# Patient Record
Sex: MALE | Race: OTHER | HISPANIC OR LATINO | URBAN - METROPOLITAN AREA
[De-identification: names, ages, dates, MRNs, and addresses within clinical notes are randomized per-mention and may not be internally consistent; named-entity substitution may affect disease eponyms.]

---

## 2020-08-10 ENCOUNTER — EMERGENCY (EMERGENCY)
Facility: HOSPITAL | Age: 25
LOS: 1 days | Discharge: HOME | End: 2020-08-10
Attending: EMERGENCY MEDICINE
Payer: COMMERCIAL

## 2020-08-10 VITALS
HEART RATE: 68 BPM | TEMPERATURE: 99 F | OXYGEN SATURATION: 100 % | SYSTOLIC BLOOD PRESSURE: 122 MMHG | RESPIRATION RATE: 20 BRPM | DIASTOLIC BLOOD PRESSURE: 80 MMHG

## 2020-08-10 VITALS
DIASTOLIC BLOOD PRESSURE: 96 MMHG | HEART RATE: 69 BPM | OXYGEN SATURATION: 99 % | SYSTOLIC BLOOD PRESSURE: 145 MMHG | TEMPERATURE: 99 F | WEIGHT: 184.09 LBS | HEIGHT: 62 IN | RESPIRATION RATE: 20 BRPM

## 2020-08-10 DIAGNOSIS — Z98.890 OTHER SPECIFIED POSTPROCEDURAL STATES: Chronic | ICD-10-CM

## 2020-08-10 LAB
ALBUMIN SERPL ELPH-MCNC: 3.9 G/DL — SIGNIFICANT CHANGE UP (ref 3.5–5)
ALP SERPL-CCNC: 115 U/L — SIGNIFICANT CHANGE UP (ref 40–120)
ALT FLD-CCNC: 238 U/L DA — HIGH (ref 10–60)
ANION GAP SERPL CALC-SCNC: 7 MMOL/L — SIGNIFICANT CHANGE UP (ref 5–17)
AST SERPL-CCNC: 100 U/L — HIGH (ref 10–40)
BASOPHILS # BLD AUTO: 0.05 K/UL — SIGNIFICANT CHANGE UP (ref 0–0.2)
BASOPHILS NFR BLD AUTO: 0.5 % — SIGNIFICANT CHANGE UP (ref 0–2)
BILIRUB SERPL-MCNC: 0.3 MG/DL — SIGNIFICANT CHANGE UP (ref 0.2–1.2)
BUN SERPL-MCNC: 10 MG/DL — SIGNIFICANT CHANGE UP (ref 7–18)
CALCIUM SERPL-MCNC: 8.8 MG/DL — SIGNIFICANT CHANGE UP (ref 8.4–10.5)
CHLORIDE SERPL-SCNC: 107 MMOL/L — SIGNIFICANT CHANGE UP (ref 96–108)
CK SERPL-CCNC: 136 U/L — SIGNIFICANT CHANGE UP (ref 35–232)
CO2 SERPL-SCNC: 27 MMOL/L — SIGNIFICANT CHANGE UP (ref 22–31)
CREAT SERPL-MCNC: 0.8 MG/DL — SIGNIFICANT CHANGE UP (ref 0.5–1.3)
D DIMER BLD IA.RAPID-MCNC: <150 NG/ML DDU — SIGNIFICANT CHANGE UP
EOSINOPHIL # BLD AUTO: 0.5 K/UL — SIGNIFICANT CHANGE UP (ref 0–0.5)
EOSINOPHIL NFR BLD AUTO: 4.7 % — SIGNIFICANT CHANGE UP (ref 0–6)
GLUCOSE SERPL-MCNC: 99 MG/DL — SIGNIFICANT CHANGE UP (ref 70–99)
HCT VFR BLD CALC: 46.3 % — SIGNIFICANT CHANGE UP (ref 39–50)
HGB BLD-MCNC: 16.1 G/DL — SIGNIFICANT CHANGE UP (ref 13–17)
IMM GRANULOCYTES NFR BLD AUTO: 0.3 % — SIGNIFICANT CHANGE UP (ref 0–1.5)
LYMPHOCYTES # BLD AUTO: 3.47 K/UL — HIGH (ref 1–3.3)
LYMPHOCYTES # BLD AUTO: 32.7 % — SIGNIFICANT CHANGE UP (ref 13–44)
MCHC RBC-ENTMCNC: 30.2 PG — SIGNIFICANT CHANGE UP (ref 27–34)
MCHC RBC-ENTMCNC: 34.8 GM/DL — SIGNIFICANT CHANGE UP (ref 32–36)
MCV RBC AUTO: 86.9 FL — SIGNIFICANT CHANGE UP (ref 80–100)
MONOCYTES # BLD AUTO: 0.87 K/UL — SIGNIFICANT CHANGE UP (ref 0–0.9)
MONOCYTES NFR BLD AUTO: 8.2 % — SIGNIFICANT CHANGE UP (ref 2–14)
NEUTROPHILS # BLD AUTO: 5.7 K/UL — SIGNIFICANT CHANGE UP (ref 1.8–7.4)
NEUTROPHILS NFR BLD AUTO: 53.6 % — SIGNIFICANT CHANGE UP (ref 43–77)
NRBC # BLD: 0 /100 WBCS — SIGNIFICANT CHANGE UP (ref 0–0)
PLATELET # BLD AUTO: 254 K/UL — SIGNIFICANT CHANGE UP (ref 150–400)
POTASSIUM SERPL-MCNC: 3.9 MMOL/L — SIGNIFICANT CHANGE UP (ref 3.5–5.3)
POTASSIUM SERPL-SCNC: 3.9 MMOL/L — SIGNIFICANT CHANGE UP (ref 3.5–5.3)
PROT SERPL-MCNC: 7.7 G/DL — SIGNIFICANT CHANGE UP (ref 6–8.3)
RBC # BLD: 5.33 M/UL — SIGNIFICANT CHANGE UP (ref 4.2–5.8)
RBC # FLD: 12.8 % — SIGNIFICANT CHANGE UP (ref 10.3–14.5)
SODIUM SERPL-SCNC: 141 MMOL/L — SIGNIFICANT CHANGE UP (ref 135–145)
TROPONIN I SERPL-MCNC: <0.015 NG/ML — SIGNIFICANT CHANGE UP (ref 0–0.04)
WBC # BLD: 10.62 K/UL — HIGH (ref 3.8–10.5)
WBC # FLD AUTO: 10.62 K/UL — HIGH (ref 3.8–10.5)

## 2020-08-10 PROCEDURE — 84484 ASSAY OF TROPONIN QUANT: CPT

## 2020-08-10 PROCEDURE — 36415 COLL VENOUS BLD VENIPUNCTURE: CPT

## 2020-08-10 PROCEDURE — 82550 ASSAY OF CK (CPK): CPT

## 2020-08-10 PROCEDURE — 85379 FIBRIN DEGRADATION QUANT: CPT

## 2020-08-10 PROCEDURE — 71046 X-RAY EXAM CHEST 2 VIEWS: CPT | Mod: 26

## 2020-08-10 PROCEDURE — 93005 ELECTROCARDIOGRAM TRACING: CPT

## 2020-08-10 PROCEDURE — 99284 EMERGENCY DEPT VISIT MOD MDM: CPT | Mod: 25

## 2020-08-10 PROCEDURE — 71046 X-RAY EXAM CHEST 2 VIEWS: CPT

## 2020-08-10 PROCEDURE — 96374 THER/PROPH/DIAG INJ IV PUSH: CPT

## 2020-08-10 PROCEDURE — 85027 COMPLETE CBC AUTOMATED: CPT

## 2020-08-10 PROCEDURE — 80053 COMPREHEN METABOLIC PANEL: CPT

## 2020-08-10 PROCEDURE — 99285 EMERGENCY DEPT VISIT HI MDM: CPT | Mod: 25

## 2020-08-10 RX ORDER — KETOROLAC TROMETHAMINE 30 MG/ML
30 SYRINGE (ML) INJECTION ONCE
Refills: 0 | Status: DISCONTINUED | OUTPATIENT
Start: 2020-08-10 | End: 2020-08-10

## 2020-08-10 RX ADMIN — Medication 30 MILLIGRAM(S): at 20:55

## 2020-08-10 NOTE — ED PROVIDER NOTE - OBJECTIVE STATEMENT
24 y/o M with a significant PSHx of cervical spine surgery presents to the ED with complaints of L upper focal chest pain since yesterday. Pain described as constant, pressure-like, and sometimes radiates to the L arm. Pain worsened with movement. Patient states he has never felt this pain before. Denies abdominal pain, leg pain, shortness of breath, diaphoresis or any other acute complaints. Patient states he occasionally drinks alcohol.

## 2020-10-12 ENCOUNTER — EMERGENCY (EMERGENCY)
Facility: HOSPITAL | Age: 25
LOS: 1 days | Discharge: HOME | End: 2020-10-12
Attending: EMERGENCY MEDICINE
Payer: COMMERCIAL

## 2020-10-12 VITALS
RESPIRATION RATE: 16 BRPM | TEMPERATURE: 98 F | DIASTOLIC BLOOD PRESSURE: 88 MMHG | HEIGHT: 62 IN | WEIGHT: 184.53 LBS | OXYGEN SATURATION: 96 % | HEART RATE: 72 BPM | SYSTOLIC BLOOD PRESSURE: 134 MMHG

## 2020-10-12 DIAGNOSIS — Z98.890 OTHER SPECIFIED POSTPROCEDURAL STATES: Chronic | ICD-10-CM

## 2020-10-12 PROCEDURE — 99283 EMERGENCY DEPT VISIT LOW MDM: CPT

## 2020-10-12 PROCEDURE — U0003: CPT

## 2020-10-12 NOTE — ED ADULT TRIAGE NOTE - CHIEF COMPLAINT QUOTE
I was exposed to a person with positive COVID-19 result.  I need a COVID-19 test.  I do not have any symptoms

## 2020-10-12 NOTE — ED PROVIDER NOTE - PHYSICAL EXAMINATION
Eric:   Vitals normal   no distress  Awake Alert oriented to person, place, situation,   Normocephalic, atraumatic, neck supple   lungs clear bilaterally  heart s1s rrr,  Abdomen soft, nontender, nondistended  No LE swelling    No rash  Neuro exam grossly intact, no weakness, numbness,

## 2020-10-12 NOTE — ED PROVIDER NOTE - NS ED ROS FT
Pt denies fevers, chills  nausea, vomiting,   chest pain, palpitations  shortness of breath, orthopnea  abdominal pain, melena,   dysuria, hematuria   numbness, weakness, saddle anesthesia  rash  enlarged lymph nodes

## 2020-10-12 NOTE — ED PROVIDER NOTE - PATIENT PORTAL LINK FT
You can access the FollowMyHealth Patient Portal offered by [facility] by registering at the following website: [email or web address]. By joining [facility]’s FollowMyHealth portal, you will also be able to view your health information using other applications (apps) compatible with our system.

## 2020-10-12 NOTE — ED PROVIDER NOTE - OBJECTIVE STATEMENT
26 yo male who wife just delivered a baby yesteraday had a + COVID swab today, and negative one 10 days ago.  Patient wants to be checked to see if he is covid +.  He denies fevers, chills, cough, mylagias, fatigue, headache, nausea, vomiting, diarrhea, change in appetite, taste or smell.  He has not travel to endemic area nor had close contact with a confirmed case.

## 2020-10-12 NOTE — ED ADULT NURSE NOTE - CAS ELECT INFOMATION PROVIDED
DC instructions/Patient seen, treated and released in intake. See stat docs. No nursing intervention required. Verbal instructions provided and verbalized understanding.

## 2020-10-13 PROBLEM — Z78.9 OTHER SPECIFIED HEALTH STATUS: Chronic | Status: ACTIVE | Noted: 2020-08-10

## 2020-10-13 LAB — SARS-COV-2 RNA SPEC QL NAA+PROBE: SIGNIFICANT CHANGE UP

## 2020-10-17 ENCOUNTER — EMERGENCY (EMERGENCY)
Facility: HOSPITAL | Age: 25
LOS: 1 days | Discharge: HOME | End: 2020-10-17
Attending: EMERGENCY MEDICINE
Payer: COMMERCIAL

## 2020-10-17 VITALS
WEIGHT: 182.1 LBS | RESPIRATION RATE: 20 BRPM | SYSTOLIC BLOOD PRESSURE: 142 MMHG | TEMPERATURE: 98 F | HEART RATE: 74 BPM | HEIGHT: 62 IN | DIASTOLIC BLOOD PRESSURE: 86 MMHG | OXYGEN SATURATION: 99 %

## 2020-10-17 DIAGNOSIS — Z98.890 OTHER SPECIFIED POSTPROCEDURAL STATES: Chronic | ICD-10-CM

## 2020-10-17 PROCEDURE — 99282 EMERGENCY DEPT VISIT SF MDM: CPT

## 2020-10-17 NOTE — ED PROVIDER NOTE - CLINICAL SUMMARY MEDICAL DECISION MAKING FREE TEXT BOX
Patient presenting for 3rd COVID-19 screening after 2 negative screening tests. Spoke to pt at length that due to his regular exposure to his significant other who recently tested positive, he should quarantine for 14 days and assuming testing positive, undergo retesting following completion of quarantine. No indication for retest at this time. Pt expresses understanding and agreement with plan.

## 2020-10-17 NOTE — ED PROVIDER NOTE - OBJECTIVE STATEMENT
26 y/o M patient w/ no significant PMHx presents to the ED questing COVID testing. Patient reports his significant other recently gave birth and tested positive for COVID-19 via PCR and antibodies x5 days ago. Patient says he had significant screening at the time and denies any symptoms. Patient adds he had an additional COVID-19 screen x2 days ago and is requesting a 3rd screening today due to confusion. Patient says he is confused because his significant other had a negative test x2 days ago. Patient denies any other symptoms. NKDA.

## 2020-10-17 NOTE — ED PROVIDER NOTE - PATIENT PORTAL LINK FT
You can access the FollowMyHealth Patient Portal offered by [facility] by registering at the following website: [email or web address]. By joining [employer]’s FollowMyHealth portal, you will also be able to view your health information using other applications (apps) compatible with our system.

## 2021-03-09 ENCOUNTER — EMERGENCY (EMERGENCY)
Facility: HOSPITAL | Age: 26
LOS: 1 days | Discharge: HOME | End: 2021-03-09
Attending: EMERGENCY MEDICINE
Payer: COMMERCIAL

## 2021-03-09 VITALS
WEIGHT: 179.9 LBS | OXYGEN SATURATION: 97 % | RESPIRATION RATE: 17 BRPM | DIASTOLIC BLOOD PRESSURE: 87 MMHG | HEART RATE: 82 BPM | TEMPERATURE: 98 F | HEIGHT: 62 IN | SYSTOLIC BLOOD PRESSURE: 127 MMHG

## 2021-03-09 DIAGNOSIS — Z98.890 OTHER SPECIFIED POSTPROCEDURAL STATES: Chronic | ICD-10-CM

## 2021-03-09 LAB
HCOV PNL SPEC NAA+PROBE: DETECTED
RAPID RVP RESULT: DETECTED
SARS-COV-2 RNA SPEC QL NAA+PROBE: SIGNIFICANT CHANGE UP

## 2021-03-09 PROCEDURE — 99283 EMERGENCY DEPT VISIT LOW MDM: CPT

## 2021-03-09 PROCEDURE — 99284 EMERGENCY DEPT VISIT MOD MDM: CPT

## 2021-03-09 PROCEDURE — 0225U NFCT DS DNA&RNA 21 SARSCOV2: CPT

## 2021-03-09 RX ORDER — ACETAMINOPHEN 500 MG
650 TABLET ORAL ONCE
Refills: 0 | Status: COMPLETED | OUTPATIENT
Start: 2021-03-09 | End: 2021-03-09

## 2021-03-09 RX ADMIN — Medication 650 MILLIGRAM(S): at 09:15

## 2021-03-09 NOTE — ED PROVIDER NOTE - PATIENT PORTAL LINK FT
You can access the FollowMyHealth Patient Portal offered by [facility] by registering at the following website: [email or web address]. By joining [employer]’s FollowMyHealth portal, you will also be able to view your health information using other applications (apps) compatible with our system.

## 2021-03-09 NOTE — ED ADULT TRIAGE NOTE - PAIN RATING/NUMBER SCALE (0-10): REST
Pt received in stretcher + IV Loc, +Tele, + premafit, breathing on RA in NAD, in 9/10 pain in Left ankle, agreeable to PT evaluation 5 3

## 2021-03-09 NOTE — ED PROVIDER NOTE - CLINICAL SUMMARY MEDICAL DECISION MAKING FREE TEXT BOX
25 y/o M pt here for a headache, nrml neuro exam, here for a COVID test. Will do COVID screen and reassess.

## 2021-03-09 NOTE — ED PROVIDER NOTE - OBJECTIVE STATEMENT
27 y/o M patient with no significant PMHx and no significant PSHx presents to the ED here for a COVID test. Pt states having symptoms of headache. Pt reports being exposed to his friend who now has COVID 3d ago. Patient denies any fever, cough, or any other complains.

## 2021-03-09 NOTE — ED ADULT NURSE NOTE - OBJECTIVE STATEMENT
Headache and mild nose pain since 3-4 days ,reports friend testing positive for covid 19 last week ,hence came to ER for covid test

## 2021-09-08 NOTE — ED PROVIDER NOTE - PATIENT PORTAL LINK FT
PT RESTING IN BED SAFELY W/ CALL LIGHT IN REACH. EVENING ASSESMENT COMPLETED,
SCHEDULED MEDS GIVEN, AND IV ABX INFUSING PER PROVIDER ORDER. PT ASSISTED W/
REPOSITIONING IN THE BED. You can access the FollowMyHealth Patient Portal offered by [facility] by registering at the following website: [email or web address]. By joining [employer]’s FollowMyHealth portal, you will also be able to view your health information using other applications (apps) compatible with our system.

## 2022-06-08 ENCOUNTER — EMERGENCY (EMERGENCY)
Facility: HOSPITAL | Age: 27
LOS: 1 days | Discharge: HOME | End: 2022-06-08
Attending: EMERGENCY MEDICINE
Payer: MEDICAID

## 2022-06-08 VITALS
OXYGEN SATURATION: 96 % | TEMPERATURE: 98 F | SYSTOLIC BLOOD PRESSURE: 122 MMHG | HEART RATE: 75 BPM | RESPIRATION RATE: 16 BRPM | DIASTOLIC BLOOD PRESSURE: 73 MMHG | HEIGHT: 62 IN | WEIGHT: 179.9 LBS

## 2022-06-08 DIAGNOSIS — Z98.890 OTHER SPECIFIED POSTPROCEDURAL STATES: Chronic | ICD-10-CM

## 2022-06-08 PROCEDURE — 99284 EMERGENCY DEPT VISIT MOD MDM: CPT

## 2022-06-08 PROCEDURE — 99283 EMERGENCY DEPT VISIT LOW MDM: CPT | Mod: 25

## 2022-06-08 PROCEDURE — 96372 THER/PROPH/DIAG INJ SC/IM: CPT

## 2022-06-08 RX ORDER — METHOCARBAMOL 500 MG/1
2 TABLET, FILM COATED ORAL
Qty: 10 | Refills: 0
Start: 2022-06-08 | End: 2022-06-12

## 2022-06-08 RX ORDER — KETOROLAC TROMETHAMINE 30 MG/ML
15 SYRINGE (ML) INJECTION ONCE
Refills: 0 | Status: DISCONTINUED | OUTPATIENT
Start: 2022-06-08 | End: 2022-06-08

## 2022-06-08 RX ORDER — LIDOCAINE 4 G/100G
1 CREAM TOPICAL ONCE
Refills: 0 | Status: COMPLETED | OUTPATIENT
Start: 2022-06-08 | End: 2022-06-08

## 2022-06-08 RX ADMIN — Medication 15 MILLIGRAM(S): at 12:33

## 2022-06-08 RX ADMIN — LIDOCAINE 1 PATCH: 4 CREAM TOPICAL at 12:34

## 2022-06-08 RX ADMIN — Medication 15 MILLIGRAM(S): at 13:03

## 2022-06-08 NOTE — ED PROVIDER NOTE - NSFOLLOWUPINSTRUCTIONS_ED_ALL_ED_FT
Back Pain    Back pain is very common in adults. The cause of back pain is rarely dangerous and the pain often gets better over time. The cause of your back pain may not be known and may include strain of muscles or ligaments, degeneration of the spinal disks, or arthritis. Occasionally the pain may radiate down your leg(s). Over-the-counter medicines to reduce pain and inflammation are often the most helpful. Stretching and remaining active frequently helps the healing process.     SEEK IMMEDIATE MEDICAL CARE IF YOU HAVE ANY OF THE FOLLOWING SYMPTOMS: bowel or bladder control problems, unusual weakness or numbness in your arms or legs, nausea or vomiting, abdominal pain, fever, dizziness/lightheadedness.    Prescription Medication  1. NAPROXEN 500 mg Twice a day as needed for pain  2. METHOCARBAMOL (Robaxin 750 ) 2 tablets up to 3 times a day every 8 hours as needed for muscle spasms     Over the counter  1. Salonpas Lidocaine patches  (Salonpas is a popular brand)  2 Thermacare Heat Patche  3. Electric Heating Pad (sleep with this every night)    Yoga Exercises  Bridge     Therapies  Physical Therapy - see your doctor for a prescription  Acupuncture  in [place] [phone]  Chiropractor  Massage    Follow up with a Specialist  Spine or Orthopedic  *If pain persists for 6 weeks, see your doctor for x-rays or an MRI   *If  you get fevers, decreased sensation, weakness, tingling in your arms or legs or trouble urinating, weight loss or night sweats, then RETURN to the ER or see your doctor ASAP

## 2022-06-08 NOTE — ED PROVIDER NOTE - OBJECTIVE STATEMENT
A 27 year old male with PSHx of cervical spine surgery  presenting to the ED with chief complaint of left sided back pain radiating down his left leg for a few years. Patient states he had an accident in 2018, and got surgery done on his left knee. He reports he believes his right leg is always compensating. Patient additionally states pain worsens when he plays sports, but he otherwise any electricity down his leg, paresthesia, and recent trauma. NKDA.

## 2022-06-08 NOTE — ED PROVIDER NOTE - PHYSICAL EXAMINATION
Tenderness to left lower back, no paraspinal tenderness, no tenderness at hip and knee, normal pulses and sensation.

## 2022-06-08 NOTE — ED PROVIDER NOTE - PATIENT PORTAL LINK FT
You can access the FollowMyHealth Patient Portal offered by [facility] by registering at the following website: [email or web address]. By joining [employer]’s FollowMyHealth portal, you will also be able to view your health information using other applications (apps) compatible with our system.

## 2022-06-08 NOTE — ED PROVIDER NOTE - MUSCULOSKELETAL, MLM
Spine appears normal, range of motion is not limited. Tenderness to left lower back, no paraspinal tenderness. No tenderness at hip and knee.

## 2022-06-08 NOTE — ED ADULT NURSE NOTE - DATE OF LAST VACCINATION
Progress Note  Cristel Ramos MD    OBJECTIVE:    Patient seen for f/u of Acute pyelonephritis. Abdominal pan and nausea better. Hb 7.2     ROS:   Constitutional: negative  for fevers, and negative for chills. Respiratory: negative for shortness of breath, negative for cough, and negative for wheezing  Cardiovascular: negative for chest pain, and negative for palpitations  Gastrointestinal: abdominal pain better negative for nausea,negative for vomiting, negative for diarrhea, and negative for constipation     All other systems were reviewed with the patient and are negative unless otherwise stated in HPI    OBJECTIVE:  Vitals:   Temp: 97.5 °F (36.4 °C)  BP: 92/65  Resp: 16  Pulse: 86  SpO2: 98 %    24HR INTAKE/OUTPUT:      Intake/Output Summary (Last 24 hours) at 8/31/2021 1338  Last data filed at 8/31/2021 1242  Gross per 24 hour   Intake 3607 ml   Output --   Net 3607 ml     -----------------------------------------------------------------  Exam:  GEN:    Awake, alert and oriented x3. EYES:  EOMI, pupils equal   NECK: Supple. No lymphadenopathy. No carotid bruit  CVS:    regular rate and rhythm, no audible murmur  PULM:  CTA, no wheezes, rales or rhonchi, no acute respiratory distress  ABD:    Bowels sounds normal.  Abdomen is soft. No distention. no tenderness to palpation. EXT:   no edema bilaterally . No calf tenderness. NEURO: Moves all extremities. Motor and sensory are grossly intact  SKIN:  No rashes.   No skin lesions.    -----------------------------------------------------------------  Diagnostic Data:    · All available data reviewed  Lab Results   Component Value Date    WBC 12.3 (H) 08/31/2021    HGB 7.2 (L) 08/31/2021    MCV 71.1 (L) 08/31/2021     08/31/2021      Lab Results   Component Value Date    GLUCOSE 115 (H) 08/31/2021    BUN 11 08/31/2021    CREATININE 0.98 (H) 08/31/2021     08/31/2021    K 4.0 08/31/2021    CALCIUM 7.9 (L) 08/31/2021     08/31/2021    CO2 17 (L) 08/31/2021       PROBLEM LIST:  Principal Problem:    Acute pyelonephritis  Active Problems:    Essential hypertension    Type 2 diabetes mellitus with diabetic nephropathy, with long-term current use of insulin (HCC)    Diverticulitis of colon without hemorrhage    Sepsis due to Escherichia coli ([facility] [address]    Acute hyponatremia  Resolved Problems:    * No resolved hospital problems. *      ASSESSMENT / PLAN:  Acute pyelonephritis  · Change to iv rocephin as per sensitivity    · Sepsis-  iv rocephin as per sensitivity  · Anemia- hb 7.2 after transfusion. Had colonoscopy in 2019- showed diverticulosis. egd showed erosive duodenitis. obtain egd  · Acute diverticulitis- - improving,continue rocephin and flagyl  · Type 2 dm- blood sugars improving   · Nutrition status:  Well developed, well nourished with no malnutrition  · DVT prophylaxis: scd  · High risk medications: none   · Disposition:  Discharge plan is home    Cezar Puckett MD , M.D.  8/31/2021  1:38 PM 08-Nov-2021

## 2022-08-08 ENCOUNTER — EMERGENCY (EMERGENCY)
Facility: HOSPITAL | Age: 27
LOS: 1 days | Discharge: HOME | End: 2022-08-08
Attending: EMERGENCY MEDICINE
Payer: MEDICAID

## 2022-08-08 VITALS
OXYGEN SATURATION: 97 % | HEIGHT: 62 IN | DIASTOLIC BLOOD PRESSURE: 90 MMHG | RESPIRATION RATE: 16 BRPM | HEART RATE: 90 BPM | WEIGHT: 180.78 LBS | TEMPERATURE: 98 F | SYSTOLIC BLOOD PRESSURE: 130 MMHG

## 2022-08-08 DIAGNOSIS — Z98.890 OTHER SPECIFIED POSTPROCEDURAL STATES: Chronic | ICD-10-CM

## 2022-08-08 PROCEDURE — 99283 EMERGENCY DEPT VISIT LOW MDM: CPT

## 2022-08-08 PROCEDURE — 99282 EMERGENCY DEPT VISIT SF MDM: CPT

## 2022-08-08 RX ORDER — HYDROXYZINE HCL 10 MG
1 TABLET ORAL
Qty: 20 | Refills: 0
Start: 2022-08-08

## 2022-08-08 NOTE — ED PROVIDER NOTE - NSFOLLOWUPINSTRUCTIONS_ED_ALL_ED_FT
Urticaria    LO QUE NECESITA SABER:    La urticaria también se conoce sammy ronchas. Las ronchas pueden cambiar el tamaño y la forma y aparecen en cualquier lugar de la piel. La urticaria puede ser leve o severa y durar de unos minutos a unos días. Es probable que las ronchas genoveva un signo de johana reacción alérgica grave conocida sammy anafilaxis que necesita tratamiento inmediato. La urticaria que dura más de 6 semanas puede ser un trastorno crónico que necesita de tratamiento de larga duración.  Ronchas         INSTRUCCIONES SOBRE EL LEANDRO HOSPITALARIA:    Llame al número local de emergencias (911 en los [place]) si tiene síntomas o signos de anafilaxia,sammy dificultad para respirar, inflamación en dunlap boca o garganta, o sibilancias. También es posible que tenga comezón, sarpullido o sienta que se va a desmayar.    Regrese a la joão de emergencias si:  •Dunlap corazón está latiendo más rápido de lo normal.      •Usted tiene calambres o dolor june en el abdomen.      Llame a dunlap médico si:  •Tiene fiebre.      •Dunlap piel todavía tiene comezón 24 horas después de tomarse dunlap medicamento.      •Usted todavía tiene ronchas 7 días después.      •Lina articulaciones están adoloridas e inflamadas.      •Usted tiene preguntas o inquietudes acerca de dunlap condición o cuidado.      Medicamentos:Es posible que usted necesite alguno de los siguientes:  •La epinefrinase usa para tratar reacciones alérgicas graves sammy la anafilaxia.      •Los antihistamínicosreducen los síntomas moderados sammy la comezón o un sarpullido.      •Los esteroidesreducen el enrojecimiento, el dolor y la inflamación.      •[place] lina medicamentos sammy se le haya indicado.Consulte con dunlap médico si usted bailee que dunlap medicamento no le está ayudando o si presenta efectos secundarios. Infórmele si es alérgico a cualquier medicamento. Mantenga johana lista actualizada de los medicamentos, las vitaminas y los productos herbales que tony. Incluya los siguientes datos de los medicamentos: cantidad, frecuencia y motivo de administración. Traiga con usted la lista o los envases de las píldoras a lina citas de seguimiento. Lleve la lista de los medicamentos con usted en leeroy de johana emergencia.      Pautas que necesito seguir para los signos o síntomas de la anafilaxia:  •Inmediatamenteaplíquese 1 inyección de epinefrina peter hágalo solamente en el músculo del muslo que da hacia afuera.  Cómo aplicar johana inyección de epinefrina a un adulto           •Deje la inyección en el lugarsegún las indicaciones. Es probable que dunlap médico le recomiende que se la deje puesta por hasta 10 segundos antes de quitarla. [place] ayuda a asegurarse de que toda la epinefrina sea aplicada.      •Llame al 911 y vaya al servicio de urgencias,aunque la inyección haya oumar lina síntomas. No maneje usted mismo. Lleve con usted la inyección de epinefrina que usó.      Precauciones de seguridad a blank si usted corre riesgo de anafilaxia:  •Tenga a mano 2 inyecciones de epinefrina en todo momento.Puede que usted necesite johana segunda inyección ya que la epinefrina solamente actúa por aproximadamente 20 minutos y los síntomas podrían regresar. Dunlap médico puede mostrarle a usted y a lina familiares cómo aplicar la inyección. Revise la fecha de vencimiento todos los meses y reemplace el medicamento de dunlap vencimiento.      •[place] un plan de acción.Dunlap médico puede ayudarle a crear un plan escrito que explique la alergia y un plan de emergencia para tratar johana reacción. El plan explica cuándo aplicar johana segunda inyección de epinefrina si los síntomas vuelven o no mejoran después de la primera inyección. Asegúrese de que lina familiares, personal de dunlap trabajo y escuela tengan johana copia del plan de acción e instrucciones de emergencia. Muéstreles cómo aplicar la inyección de epinefrina.      •Tenga cuidado cuando wes ejercicio.Si usted tuvo anafilaxis inducida por el ejercicio, no se ejercite inmediatamente después de comer. Pare de ejercitarse de inmediato si empieza a desarrollar cualquier signo o síntoma de anafilaxia. Puede que al principio se sienta cansado, caliente o tenga comezón en la piel. También podría desarrollar urticaria, inflamación y problemas graves de respiración si continúa ejercitándose.      •Lleve johana identificación de alerta médica.Use un brazalete o collar de alerta médica o lleve johana tarjeta que explique la alergia. Pregúntele a dunlap médico dónde conseguir estos artículos.   Accesorios de alerta médica           •Mantenga un diario de los desencadenantes y síntomas.Anote todo lo que usted come, tony o aplique en dunlap piel por 3 semanas. Incluya eventos estresantes y lo que usted estaba haciendo ciro antes de que empezara la urticaria. Traiga dunlap registro a las citas de seguimiento con dunlap médico.      Controle la urticaria:  •Enfríe dunlap piel.Puede que esto le ayude a disminuir la comezón. Aplíquese johana compresa fría sobre la urticaria. Sumerja johana toalla en agua fría, escúrrala y póngasela sobre la urticaria. También puede empapar dunlap piel en un baño de agua fría con lee.      •No se rasque las ronchas.[place] puede irritar dunlap piel y causarle más ronchas.      •Use ropa holgada.La ropa ajustada podría irritar dunlap piel y causarle más ronchas.      •Controle el estrés.El estrés podría provocar la urticaria o incluso empeorarla. Aprenda nuevas maneras de relajarse sammy la respiración profunda.      Acuda a lina consultas de control con dunlap médico según le indicaron:Anote lina preguntas para que se acuerde de hacerlas edy lina visitas.       © Copyright [employer] 2022           back to top                          © Copyright [employer] 2022

## 2022-08-08 NOTE — ED ADULT NURSE NOTE - CADM POA URETHRAL CATHETER
Labs have been reviewed and signed Ashly Humphrey Synnamon   on 6/17/2022  Per protocol, patient is ok to proceed with Cycle 26 Day 1 treatment on [facility] 6/20/22  Patients Hgb was 8 6 , Angel Bermudez feels this is R/T nephrostomy tube change blood loss but will recheck Hgb on 6/20/22 prior to tx  No

## 2022-08-08 NOTE — ED PROVIDER NOTE - PHYSICAL EXAMINATION
Gen: Well-developed, well-nourished, NAD, VS as noted by nursing. HEENT: NCAT, mmm, airway patent, no tongue or pharyngeal edema  Chest: RRR, nl S1 and S2, no m/r/g. Resp: CTAB, no w/r/r  Abd: nl BS, soft, nt/nd. Ext: Warm, dry  Skin: diffuse urticarial rash.

## 2022-08-08 NOTE — ED PROVIDER NOTE - OBJECTIVE STATEMENT
Patient reports 3 days of diffuse, itchy rash, that moves around body. No relief from benadryl. No throat itching, sob, facial swelling, n/v, known allergies, new exposures.

## 2022-08-08 NOTE — ED PROVIDER NOTE - PATIENT PORTAL LINK FT
You can access the FollowMyHealth Patient Portal offered by  by registering at the following website: [email or web address]. By joining [employer]’s FollowMyHealth portal, you will also be able to view your health information using other applications (apps) compatible with our system.

## 2022-08-25 ENCOUNTER — EMERGENCY (EMERGENCY)
Facility: HOSPITAL | Age: 27
LOS: 1 days | Discharge: HOME | End: 2022-08-25
Attending: EMERGENCY MEDICINE
Payer: MEDICAID

## 2022-08-25 VITALS
HEART RATE: 81 BPM | OXYGEN SATURATION: 98 % | TEMPERATURE: 100 F | DIASTOLIC BLOOD PRESSURE: 98 MMHG | RESPIRATION RATE: 16 BRPM | WEIGHT: 179.9 LBS | HEIGHT: 62 IN | SYSTOLIC BLOOD PRESSURE: 159 MMHG

## 2022-08-25 VITALS
HEART RATE: 74 BPM | OXYGEN SATURATION: 99 % | RESPIRATION RATE: 15 BRPM | SYSTOLIC BLOOD PRESSURE: 140 MMHG | TEMPERATURE: 99 F | DIASTOLIC BLOOD PRESSURE: 80 MMHG

## 2022-08-25 DIAGNOSIS — Z98.890 OTHER SPECIFIED POSTPROCEDURAL STATES: Chronic | ICD-10-CM

## 2022-08-25 LAB
ALBUMIN SERPL ELPH-MCNC: 4.3 G/DL — SIGNIFICANT CHANGE UP (ref 3.5–5)
ALP SERPL-CCNC: 112 U/L — SIGNIFICANT CHANGE UP (ref 40–120)
ALT FLD-CCNC: 278 U/L DA — HIGH (ref 10–60)
ANION GAP SERPL CALC-SCNC: 7 MMOL/L — SIGNIFICANT CHANGE UP (ref 5–17)
AST SERPL-CCNC: 191 U/L — HIGH (ref 10–40)
BASOPHILS # BLD AUTO: 0.03 K/UL — SIGNIFICANT CHANGE UP (ref 0–0.2)
BASOPHILS NFR BLD AUTO: 0.3 % — SIGNIFICANT CHANGE UP (ref 0–2)
BILIRUB SERPL-MCNC: 0.6 MG/DL — SIGNIFICANT CHANGE UP (ref 0.2–1.2)
BUN SERPL-MCNC: 10 MG/DL — SIGNIFICANT CHANGE UP (ref 7–18)
CALCIUM SERPL-MCNC: 9.7 MG/DL — SIGNIFICANT CHANGE UP (ref 8.4–10.5)
CHLORIDE SERPL-SCNC: 105 MMOL/L — SIGNIFICANT CHANGE UP (ref 96–108)
CO2 SERPL-SCNC: 27 MMOL/L — SIGNIFICANT CHANGE UP (ref 22–31)
CREAT SERPL-MCNC: 0.95 MG/DL — SIGNIFICANT CHANGE UP (ref 0.5–1.3)
D DIMER BLD IA.RAPID-MCNC: <150 NG/ML DDU — SIGNIFICANT CHANGE UP
EGFR: 114 ML/MIN/1.73M2 — SIGNIFICANT CHANGE UP
EOSINOPHIL # BLD AUTO: 0.27 K/UL — SIGNIFICANT CHANGE UP (ref 0–0.5)
EOSINOPHIL NFR BLD AUTO: 2.5 % — SIGNIFICANT CHANGE UP (ref 0–6)
GLUCOSE SERPL-MCNC: 119 MG/DL — HIGH (ref 70–99)
HCT VFR BLD CALC: 49.3 % — SIGNIFICANT CHANGE UP (ref 39–50)
HGB BLD-MCNC: 16.9 G/DL — SIGNIFICANT CHANGE UP (ref 13–17)
IMM GRANULOCYTES NFR BLD AUTO: 0.6 % — SIGNIFICANT CHANGE UP (ref 0–1.5)
LYMPHOCYTES # BLD AUTO: 2.44 K/UL — SIGNIFICANT CHANGE UP (ref 1–3.3)
LYMPHOCYTES # BLD AUTO: 22.5 % — SIGNIFICANT CHANGE UP (ref 13–44)
MCHC RBC-ENTMCNC: 30.7 PG — SIGNIFICANT CHANGE UP (ref 27–34)
MCHC RBC-ENTMCNC: 34.3 GM/DL — SIGNIFICANT CHANGE UP (ref 32–36)
MCV RBC AUTO: 89.5 FL — SIGNIFICANT CHANGE UP (ref 80–100)
MONOCYTES # BLD AUTO: 0.92 K/UL — HIGH (ref 0–0.9)
MONOCYTES NFR BLD AUTO: 8.5 % — SIGNIFICANT CHANGE UP (ref 2–14)
NEUTROPHILS # BLD AUTO: 7.11 K/UL — SIGNIFICANT CHANGE UP (ref 1.8–7.4)
NEUTROPHILS NFR BLD AUTO: 65.6 % — SIGNIFICANT CHANGE UP (ref 43–77)
NRBC # BLD: 0 /100 WBCS — SIGNIFICANT CHANGE UP (ref 0–0)
PLATELET # BLD AUTO: 269 K/UL — SIGNIFICANT CHANGE UP (ref 150–400)
POTASSIUM SERPL-MCNC: 3.9 MMOL/L — SIGNIFICANT CHANGE UP (ref 3.5–5.3)
POTASSIUM SERPL-SCNC: 3.9 MMOL/L — SIGNIFICANT CHANGE UP (ref 3.5–5.3)
PROT SERPL-MCNC: 8.6 G/DL — HIGH (ref 6–8.3)
RBC # BLD: 5.51 M/UL — SIGNIFICANT CHANGE UP (ref 4.2–5.8)
RBC # FLD: 13.4 % — SIGNIFICANT CHANGE UP (ref 10.3–14.5)
SODIUM SERPL-SCNC: 139 MMOL/L — SIGNIFICANT CHANGE UP (ref 135–145)
TROPONIN I, HIGH SENSITIVITY RESULT: 18.5 NG/L — SIGNIFICANT CHANGE UP
WBC # BLD: 10.83 K/UL — HIGH (ref 3.8–10.5)
WBC # FLD AUTO: 10.83 K/UL — HIGH (ref 3.8–10.5)

## 2022-08-25 PROCEDURE — 84484 ASSAY OF TROPONIN QUANT: CPT

## 2022-08-25 PROCEDURE — 0225U NFCT DS DNA&RNA 21 SARSCOV2: CPT

## 2022-08-25 PROCEDURE — 93010 ELECTROCARDIOGRAM REPORT: CPT

## 2022-08-25 PROCEDURE — 85379 FIBRIN DEGRADATION QUANT: CPT

## 2022-08-25 PROCEDURE — 99285 EMERGENCY DEPT VISIT HI MDM: CPT | Mod: 25

## 2022-08-25 PROCEDURE — 93005 ELECTROCARDIOGRAM TRACING: CPT

## 2022-08-25 PROCEDURE — 71046 X-RAY EXAM CHEST 2 VIEWS: CPT

## 2022-08-25 PROCEDURE — 71046 X-RAY EXAM CHEST 2 VIEWS: CPT | Mod: 26

## 2022-08-25 PROCEDURE — 80053 COMPREHEN METABOLIC PANEL: CPT

## 2022-08-25 PROCEDURE — 85025 COMPLETE CBC W/AUTO DIFF WBC: CPT

## 2022-08-25 PROCEDURE — 36415 COLL VENOUS BLD VENIPUNCTURE: CPT

## 2022-08-25 PROCEDURE — 99285 EMERGENCY DEPT VISIT HI MDM: CPT

## 2022-08-25 RX ORDER — ACETAMINOPHEN 500 MG
650 TABLET ORAL ONCE
Refills: 0 | Status: COMPLETED | OUTPATIENT
Start: 2022-08-25 | End: 2022-08-25

## 2022-08-25 RX ADMIN — Medication 650 MILLIGRAM(S): at 18:24

## 2022-08-25 RX ADMIN — Medication 650 MILLIGRAM(S): at 18:54

## 2022-08-25 NOTE — ED PROVIDER NOTE - PATIENT PORTAL LINK FT
You can access the FollowMyHealth Patient Portal offered by [facility] by registering at the following website: [email or web address]. By joining [employer]’s FollowMyHealth portal, you will also be able to view your health information using other applications (apps) compatible with our system.

## 2022-08-25 NOTE — ED PROVIDER NOTE - CARDIAC, MLM
[Follow-Up: _____] : a [unfilled] follow-up visit Normal rate, regular rhythm.  Heart sounds S1, S2.  No murmurs, rubs or gallops.

## 2022-08-25 NOTE — ED PROVIDER NOTE - CLINICAL SUMMARY MEDICAL DECISION MAKING FREE TEXT BOX
26 y/o male with chest pain radiating to upper back x3 days, will check labs including troponin and d-dimer, will get chest x-ray, EKG and reassess.

## 2022-08-25 NOTE — ED PROVIDER NOTE - OBJECTIVE STATEMENT
26 y/o male with no significant PMHx presents to ED c/o back pain. Patient notes atraumatic L sided chest pain radiating to L side upper back x3 days. Patient notes pain worse with inspiration. Patient denies injury to chest or recent gym workout. Patient denies shortness of breath, fever, chills or cough. Patient notes has been taking Tylenol and Ibuprofen with short lived relief, last dose x last night. NKDA.

## 2022-08-26 LAB
B PERT DNA SPEC QL NAA+PROBE: SIGNIFICANT CHANGE UP
C PNEUM DNA SPEC QL NAA+PROBE: SIGNIFICANT CHANGE UP
FLUAV H1 2009 PAND RNA SPEC QL NAA+PROBE: SIGNIFICANT CHANGE UP
FLUAV H1 RNA SPEC QL NAA+PROBE: SIGNIFICANT CHANGE UP
FLUAV H3 RNA SPEC QL NAA+PROBE: SIGNIFICANT CHANGE UP
FLUAV SUBTYP SPEC NAA+PROBE: SIGNIFICANT CHANGE UP
FLUBV RNA SPEC QL NAA+PROBE: SIGNIFICANT CHANGE UP
HADV DNA SPEC QL NAA+PROBE: SIGNIFICANT CHANGE UP
HCOV PNL SPEC NAA+PROBE: SIGNIFICANT CHANGE UP
HMPV RNA SPEC QL NAA+PROBE: SIGNIFICANT CHANGE UP
HPIV1 RNA SPEC QL NAA+PROBE: SIGNIFICANT CHANGE UP
HPIV2 RNA SPEC QL NAA+PROBE: SIGNIFICANT CHANGE UP
HPIV3 RNA SPEC QL NAA+PROBE: SIGNIFICANT CHANGE UP
HPIV4 RNA SPEC QL NAA+PROBE: SIGNIFICANT CHANGE UP
RAPID RVP RESULT: SIGNIFICANT CHANGE UP
RSV RNA SPEC QL NAA+PROBE: SIGNIFICANT CHANGE UP
RV+EV RNA SPEC QL NAA+PROBE: SIGNIFICANT CHANGE UP
SARS-COV-2 RNA SPEC QL NAA+PROBE: SIGNIFICANT CHANGE UP

## 2022-09-01 ENCOUNTER — INPATIENT (INPATIENT)
Facility: HOSPITAL | Age: 27
LOS: 1 days | Discharge: HOME | DRG: 872 | End: 2022-09-03
Attending: INTERNAL MEDICINE | Admitting: INTERNAL MEDICINE
Payer: MEDICAID

## 2022-09-01 VITALS
HEART RATE: 104 BPM | OXYGEN SATURATION: 98 % | TEMPERATURE: 101 F | DIASTOLIC BLOOD PRESSURE: 73 MMHG | SYSTOLIC BLOOD PRESSURE: 131 MMHG | WEIGHT: 179.9 LBS | HEIGHT: 62 IN | RESPIRATION RATE: 17 BRPM

## 2022-09-01 DIAGNOSIS — Z98.890 OTHER SPECIFIED POSTPROCEDURAL STATES: Chronic | ICD-10-CM

## 2022-09-01 DIAGNOSIS — A41.9 SEPSIS, UNSPECIFIED ORGANISM: ICD-10-CM

## 2022-09-01 DIAGNOSIS — Z29.9 ENCOUNTER FOR PROPHYLACTIC MEASURES, UNSPECIFIED: ICD-10-CM

## 2022-09-01 DIAGNOSIS — N45.1 EPIDIDYMITIS: ICD-10-CM

## 2022-09-01 DIAGNOSIS — N45.3 EPIDIDYMO-ORCHITIS: ICD-10-CM

## 2022-09-01 LAB
ALBUMIN SERPL ELPH-MCNC: 4.4 G/DL — SIGNIFICANT CHANGE UP (ref 3.5–5)
ALP SERPL-CCNC: 98 U/L — SIGNIFICANT CHANGE UP (ref 40–120)
ALT FLD-CCNC: 186 U/L DA — HIGH (ref 10–60)
ANION GAP SERPL CALC-SCNC: 9 MMOL/L — SIGNIFICANT CHANGE UP (ref 5–17)
APPEARANCE UR: CLEAR — SIGNIFICANT CHANGE UP
AST SERPL-CCNC: 93 U/L — HIGH (ref 10–40)
BASOPHILS # BLD AUTO: 0.05 K/UL — SIGNIFICANT CHANGE UP (ref 0–0.2)
BASOPHILS NFR BLD AUTO: 0.2 % — SIGNIFICANT CHANGE UP (ref 0–2)
BILIRUB SERPL-MCNC: 1.2 MG/DL — SIGNIFICANT CHANGE UP (ref 0.2–1.2)
BILIRUB UR-MCNC: NEGATIVE — SIGNIFICANT CHANGE UP
BUN SERPL-MCNC: 13 MG/DL — SIGNIFICANT CHANGE UP (ref 7–18)
CALCIUM SERPL-MCNC: 9.6 MG/DL — SIGNIFICANT CHANGE UP (ref 8.4–10.5)
CHLORIDE SERPL-SCNC: 105 MMOL/L — SIGNIFICANT CHANGE UP (ref 96–108)
CO2 SERPL-SCNC: 23 MMOL/L — SIGNIFICANT CHANGE UP (ref 22–31)
COLOR SPEC: YELLOW — SIGNIFICANT CHANGE UP
CREAT SERPL-MCNC: 0.89 MG/DL — SIGNIFICANT CHANGE UP (ref 0.5–1.3)
DIFF PNL FLD: NEGATIVE — SIGNIFICANT CHANGE UP
EGFR: 122 ML/MIN/1.73M2 — SIGNIFICANT CHANGE UP
EOSINOPHIL # BLD AUTO: 0.01 K/UL — SIGNIFICANT CHANGE UP (ref 0–0.5)
EOSINOPHIL NFR BLD AUTO: 0 % — SIGNIFICANT CHANGE UP (ref 0–6)
GLUCOSE SERPL-MCNC: 89 MG/DL — SIGNIFICANT CHANGE UP (ref 70–99)
GLUCOSE UR QL: NEGATIVE — SIGNIFICANT CHANGE UP
HCT VFR BLD CALC: 46.9 % — SIGNIFICANT CHANGE UP (ref 39–50)
HGB BLD-MCNC: 16.3 G/DL — SIGNIFICANT CHANGE UP (ref 13–17)
IMM GRANULOCYTES NFR BLD AUTO: 0.8 % — SIGNIFICANT CHANGE UP (ref 0–1.5)
KETONES UR-MCNC: NEGATIVE — SIGNIFICANT CHANGE UP
LACTATE SERPL-SCNC: 1.2 MMOL/L — SIGNIFICANT CHANGE UP (ref 0.7–2)
LEUKOCYTE ESTERASE UR-ACNC: NEGATIVE — SIGNIFICANT CHANGE UP
LYMPHOCYTES # BLD AUTO: 2.02 K/UL — SIGNIFICANT CHANGE UP (ref 1–3.3)
LYMPHOCYTES # BLD AUTO: 8.4 % — LOW (ref 13–44)
MCHC RBC-ENTMCNC: 30.5 PG — SIGNIFICANT CHANGE UP (ref 27–34)
MCHC RBC-ENTMCNC: 34.8 GM/DL — SIGNIFICANT CHANGE UP (ref 32–36)
MCV RBC AUTO: 87.7 FL — SIGNIFICANT CHANGE UP (ref 80–100)
MONOCYTES # BLD AUTO: 1.84 K/UL — HIGH (ref 0–0.9)
MONOCYTES NFR BLD AUTO: 7.7 % — SIGNIFICANT CHANGE UP (ref 2–14)
NEUTROPHILS # BLD AUTO: 19.85 K/UL — HIGH (ref 1.8–7.4)
NEUTROPHILS NFR BLD AUTO: 82.9 % — HIGH (ref 43–77)
NITRITE UR-MCNC: NEGATIVE — SIGNIFICANT CHANGE UP
NRBC # BLD: 0 /100 WBCS — SIGNIFICANT CHANGE UP (ref 0–0)
PH UR: 8 — SIGNIFICANT CHANGE UP (ref 5–8)
PLATELET # BLD AUTO: 339 K/UL — SIGNIFICANT CHANGE UP (ref 150–400)
POTASSIUM SERPL-MCNC: 5.5 MMOL/L — HIGH (ref 3.5–5.3)
POTASSIUM SERPL-SCNC: 5.5 MMOL/L — HIGH (ref 3.5–5.3)
PROT SERPL-MCNC: 8.5 G/DL — HIGH (ref 6–8.3)
PROT UR-MCNC: 30 MG/DL
RBC # BLD: 5.35 M/UL — SIGNIFICANT CHANGE UP (ref 4.2–5.8)
RBC # FLD: 13 % — SIGNIFICANT CHANGE UP (ref 10.3–14.5)
SODIUM SERPL-SCNC: 137 MMOL/L — SIGNIFICANT CHANGE UP (ref 135–145)
SP GR SPEC: 1.01 — SIGNIFICANT CHANGE UP (ref 1.01–1.02)
UROBILINOGEN FLD QL: NEGATIVE — SIGNIFICANT CHANGE UP
WBC # BLD: 23.95 K/UL — HIGH (ref 3.8–10.5)
WBC # FLD AUTO: 23.95 K/UL — HIGH (ref 3.8–10.5)

## 2022-09-01 PROCEDURE — 76870 US EXAM SCROTUM: CPT | Mod: 26

## 2022-09-01 PROCEDURE — 99285 EMERGENCY DEPT VISIT HI MDM: CPT

## 2022-09-01 RX ORDER — ACETAMINOPHEN 500 MG
650 TABLET ORAL EVERY 6 HOURS
Refills: 0 | Status: DISCONTINUED | OUTPATIENT
Start: 2022-09-01 | End: 2022-09-03

## 2022-09-01 RX ORDER — AZITHROMYCIN 500 MG/1
1000 TABLET, FILM COATED ORAL ONCE
Refills: 0 | Status: COMPLETED | OUTPATIENT
Start: 2022-09-01 | End: 2022-09-01

## 2022-09-01 RX ORDER — SODIUM CHLORIDE 9 MG/ML
1000 INJECTION INTRAMUSCULAR; INTRAVENOUS; SUBCUTANEOUS
Refills: 0 | Status: COMPLETED | OUTPATIENT
Start: 2022-09-01 | End: 2022-09-02

## 2022-09-01 RX ORDER — SODIUM CHLORIDE 9 MG/ML
1000 INJECTION INTRAMUSCULAR; INTRAVENOUS; SUBCUTANEOUS ONCE
Refills: 0 | Status: COMPLETED | OUTPATIENT
Start: 2022-09-01 | End: 2022-09-01

## 2022-09-01 RX ORDER — ACETAMINOPHEN 500 MG
975 TABLET ORAL ONCE
Refills: 0 | Status: COMPLETED | OUTPATIENT
Start: 2022-09-01 | End: 2022-09-01

## 2022-09-01 RX ORDER — KETOROLAC TROMETHAMINE 30 MG/ML
15 SYRINGE (ML) INJECTION ONCE
Refills: 0 | Status: DISCONTINUED | OUTPATIENT
Start: 2022-09-01 | End: 2022-09-01

## 2022-09-01 RX ORDER — CEFTRIAXONE 500 MG/1
500 INJECTION, POWDER, FOR SOLUTION INTRAMUSCULAR; INTRAVENOUS ONCE
Refills: 0 | Status: COMPLETED | OUTPATIENT
Start: 2022-09-01 | End: 2022-09-01

## 2022-09-01 RX ORDER — KETOROLAC TROMETHAMINE 30 MG/ML
30 SYRINGE (ML) INJECTION EVERY 6 HOURS
Refills: 0 | Status: DISCONTINUED | OUTPATIENT
Start: 2022-09-01 | End: 2022-09-03

## 2022-09-01 RX ORDER — PANTOPRAZOLE SODIUM 20 MG/1
40 TABLET, DELAYED RELEASE ORAL
Refills: 0 | Status: DISCONTINUED | OUTPATIENT
Start: 2022-09-01 | End: 2022-09-03

## 2022-09-01 RX ADMIN — Medication 15 MILLIGRAM(S): at 12:56

## 2022-09-01 RX ADMIN — CEFTRIAXONE 500 MILLIGRAM(S): 500 INJECTION, POWDER, FOR SOLUTION INTRAMUSCULAR; INTRAVENOUS at 17:56

## 2022-09-01 RX ADMIN — SODIUM CHLORIDE 80 MILLILITER(S): 9 INJECTION INTRAMUSCULAR; INTRAVENOUS; SUBCUTANEOUS at 19:57

## 2022-09-01 RX ADMIN — Medication 15 MILLIGRAM(S): at 12:00

## 2022-09-01 RX ADMIN — Medication 110 MILLIGRAM(S): at 19:56

## 2022-09-01 RX ADMIN — SODIUM CHLORIDE 1000 MILLILITER(S): 9 INJECTION INTRAMUSCULAR; INTRAVENOUS; SUBCUTANEOUS at 13:27

## 2022-09-01 RX ADMIN — AZITHROMYCIN 1000 MILLIGRAM(S): 500 TABLET, FILM COATED ORAL at 17:55

## 2022-09-01 NOTE — H&P ADULT - ASSESSMENT
Patient is a 26 y/o M with PMH of fatty liver disease, who presents with L sided abd pain and L testicular pain since 2pm yesterday afternoon admitted for epididymoorchitis.

## 2022-09-01 NOTE — ED PROVIDER NOTE - CLINICAL SUMMARY MEDICAL DECISION MAKING FREE TEXT BOX
26 y/o m with no pertinent pmh who presents with L sided abd pain and L testicular pain since 2pm yesterday afternoon. Pain started suddenly, worse with exertion, and L abd and L testicle tender to palpation. Will order labs, UA, scrotal ultrasound, GC/CT screen, reassess 28 y/o m with no pertinent pmh who presents with L sided abd pain and L testicular pain since 2pm yesterday afternoon. Pain started suddenly, worse with exertion, and L abd and L testicle tender to palpation. Will order pain meds, labs, UA, scrotal ultrasound, GC/CT screen, HIV, reassess

## 2022-09-01 NOTE — H&P ADULT - PROBLEM SELECTOR PLAN 2
Patient p/w fever 100.8 and WBC count of 24k.   - Meets SIRS criteria.  - Lactate - 1.2.  - S/P 1 L bolus. p/w fever, abdominal and left testicular pain.  - S/P 1 dose of azithromycin, ceftriaxone and levofloxacin.   - F/U Bcx, Ucx.  - UA negative.  - F/U NAAT  - Urology consulted.

## 2022-09-01 NOTE — ED ADULT NURSE NOTE - CCCP TRG CHIEF CMPLNT
Height: 6'2" Weight: 291lbs, IBW 190lbs+/-10%, %%, BMI 37.4  IBW used for calculations as pt >120% of IBW   Nutrient needs based on [facility] standards of care for maintenance in older adults.   Needs adjusted for age and post-op abdominal pain

## 2022-09-01 NOTE — H&P ADULT - NSHPSOCIALHISTORY_GEN_ALL_CORE
Patient lives at home with wife and 1 child.  Patient reports drinking 1 pack cigarette over the weekend.   Patient also reports drinking 6-10 beers over the weekend.

## 2022-09-01 NOTE — ED ADULT NURSE NOTE - NSIMPLEMENTINTERV_GEN_ALL_ED
Implemented All Universal Safety Interventions:  [place] to call system. Call bell, personal items and telephone within reach. Instruct patient to call for assistance. Room bathroom lighting operational. Non-slip footwear when patient is off stretcher. Physically safe environment: no spills, clutter or unnecessary equipment. Stretcher in lowest position, wheels locked, appropriate side rails in place.

## 2022-09-01 NOTE — H&P ADULT - ATTENDING COMMENTS
Patient is a 26 y/o M with PMH of fatty liver disease, who presents with L sided abd pain and L testicular pain since 2pm yesterday afternoon. Pt was walking home when he suddenly felt sharp pain in his L abdomen and testicle. Since then, pain has stayed constant in severity, is worse with movement, and has no alleviating factors aside from rest. Pt also endorses headache with nausea but denies vomiting, though has had poor appetite since pain began. Pt denies any fever, SOB, cough, chest pain, dysuria, hematuria, flank pain, urethral discharge or diarrhea. Patient reports he is sexually active with wife only and has no history of STIs. Pt does not recall any recent physical trauma to either the abdomen or testicle. There were no other complains.     assessment  -- sepsis, epididymoorchitis, h/o fatty liver disease    plan  --  admit to med, doxycycline, cont preadmit home meds, gi and dvt prophylaxis  cbc, bmp, mg, phos, lipids, tsh, bld cx, ua, ucx, gonococcus /chlamydia,       urology cons  id cons

## 2022-09-01 NOTE — CONSULT NOTE ADULT - SUBJECTIVE AND OBJECTIVE BOX
HPI    27 yoM with no significant PMH presents with complaints of left sided testicular and abdominal pain that started yesterday. Patient reports that the symptoms associated with nausea, but no vomiting. Feels that the pain has not been alleviated by anything and worsens with movement. Denies STI history, No recent trauma, one sexual partner at this time.     In the ED, vitals remarkable for tachy to 104, febrile to 100.8, normotensive. Labs remarkable for leukocytosis WBC 23.95, Cr 0.89. UA negative. Ultrasound in the ED with hypervascularity of the L testicle, hypoechoic focus on L epidydimal head, swelling of L epididymus, consistent with epididymal orchitis, blood flow present bilaterally.     PAST MEDICAL & SURGICAL HISTORY:  No pertinent past medical history      H/O cervical spine surgery          MEDICATIONS  (STANDING):    MEDICATIONS  (PRN):      FAMILY HISTORY:      Allergies    No Known Allergies    Intolerances        SOCIAL HISTORY:    REVIEW OF SYSTEMS: Otherwise negative as stated in HPI    Physical Exam  Vital signs  T(C): 38.2 (22 @ 09:59), Max: 38.2 (22 @ 09:59)  HR: 104 (22 @ 09:59)  BP: 131/73 (22 @ 09:59)  SpO2: 98% (22 @ 09:59)  Wt(kg): --    Output      Gen: awake and alert. NAD.   Pulm: Normal work of breathing on RA  Abd: Soft, nontender, nondistended.   : Voiding freely,       LABS:       @ 11:50    WBC 23.95 / Hct 46.9  / SCr 0.89         137  |  105  |  13  ----------------------------<  89  5.5<H>   |  23  |  0.89    Ca    9.6      01 Sep 2022 11:50    TPro  8.5<H>  /  Alb  4.4  /  TBili  1.2  /  DBili  x   /  AST  93<H>  /  ALT  186<H>  /  AlkPhos  98        Urinalysis Basic - ( 01 Sep 2022 12:47 )    Color: Yellow / Appearance: Clear / S.015 / pH: x  Gluc: x / Ketone: Negative  / Bili: Negative / Urobili: Negative   Blood: x / Protein: 30 mg/dL / Nitrite: Negative   Leuk Esterase: Negative / RBC: x / WBC x   Sq Epi: x / Non Sq Epi: x / Bacteria: x      Blood Cx: Pending    RADIOLOGY:    < from: US Testicles (22 @ 12:26) >    ACC: [id number] EXAM:  US SCROTUM AND CONTENTS                          PROCEDURE DATE:  2022          INTERPRETATION:  CLINICAL INFORMATION: Left testicular pain and swelling    COMPARISON: None available.    TECHNIQUE: Testicular ultrasound utilizing color and spectral Doppler.    FINDINGS:    RIGHT:  Right testis: 4.1 x 2.2 x  2.1 cm. Normal echogenicity and echotexture   with no masses or areas of architectural distortion. Normal arterial and   venous blood flow pattern.  Right epididymis: There is a 0.5 cm cyst within the epididymal head.   Additional 0.8 cm hypoechoic focus in the epididymal head, uncertain   significance.  Right hydrocele: Trace.  Right varicocele: None.    LEFT:  Left testis: 4.1 x 2.3 x 3.1 cm. Normal echogenicity and echotexture with   no masses or areas of architectural distortion. Increased vascularity   with color and spectral Doppler.  Left epididymis: Enlarged and heterogeneous with increased vascularity.  Left hydrocele: Trace.  Left varicocele: None.    IMPRESSION:    Enlarged, heterogeneous left epididymis with increased vascularity of the   left epididymis and testicle, most compatible with epididymoorchitis.    There is a 0.8 cm hypoechoic focus in the right epididymal head of   uncertain significance. One month follow-up testicular ultrasound is   recommended to assure resolution.    Trace bilateral hydroceles.        --- End of Report ---            PATRICK WEBB MD; Attending Radiologist  This document has been electronically signed. Sep  1 2022 12:49PM    < end of copied text >

## 2022-09-01 NOTE — PHARMACOTHERAPY INTERVENTION NOTE - COMMENTS
Patient's home pharmacy on record was contacted and the Outside Medication Record was updated based on the pharmacy prescription history.

## 2022-09-01 NOTE — H&P ADULT - NSHPPHYSICALEXAM_GEN_ALL_CORE
PHYSICAL EXAM:  GENERAL: NAD, speaks in full sentences, no signs of respiratory distress  HEAD:  Atraumatic, Normocephalic  EYES: EOMI, PERRLA, conjunctiva and sclera clear  NECK: Supple, No JVD  CHEST/LUNG: Clear to auscultation bilaterally; No wheeze; No crackles; No accessory muscles used  HEART: Regular rate and rhythm; No murmurs;   ABDOMEN: Soft, LUQ and LLQ tenderness on deep palpation, Nondistended; Bowel sounds present; No guarding  Genitourinary: tender, warm and swollen left testis, no urethral discharge.   EXTREMITIES:  2+ Peripheral Pulses, No cyanosis or edema  PSYCH: AAOx3  NEUROLOGY: non-focal  SKIN: No rashes or lesions

## 2022-09-01 NOTE — CONSULT NOTE ADULT - ASSESSMENT
27 M with no remarkable hx presents with L scrotal pain, found to have epididymal orchitis.    Vitals reviewed, remarkable for tachy to 104, febrile to 100.8, normotensive.  Labs remarkable for leukocytosis WBC 23.95, Cr 0.89. UA negative. Blood cultures pending, urine cultures not ordered.  Ultrasound in the ED with hypervascularity of the L testicle, hypoechoic focus on L epidydimal head, swelling of L epididymus, consistent with epididymal orchitis, blood flow present bilaterally.     Plan  -No acute urological intervention at this time, bilateral flow present in the testicles, no concern for torsion. Swelling and hypervascularity of the epididymus concerning for epididymal orchitis.   -Obtain GC/Chlamydia culture, urine cultures  -Start CTXx1, doxycycline 100mg BID for empiric treatment of  infection  -Continue with medical management.   -Will need outpatient follow up with Dr. White on discharge.  - Plan pending discussion with attending and evaluation.    [facility]  [phone]       27 M with no remarkable hx presents with L scrotal pain, found to have epididymal orchitis.    Vitals reviewed, remarkable for tachy to 104, febrile to 100.8, normotensive.  Labs remarkable for leukocytosis WBC 23.95, Cr 0.89. UA negative. Blood cultures pending, urine cultures not ordered.  Ultrasound in the ED with hypervascularity of the L testicle, hypoechoic focus on L epidydimal head, swelling of L epididymus, consistent with epididymal orchitis, blood flow present bilaterally.     Plan  -No acute urological intervention at this time, bilateral flow present in the testicles, no concern for torsion. Swelling and hypervascularity of the epididymus concerning for epididymoorchitis.   -Obtain GC/Chlamydia culture, urine cultures  -Start CTXx1, doxycycline 100mg BID for 10 days for empiric treatment of  infection in his age group.  -If urine culture results are positive for bacterial infection, can prescribe Levofloxacin 500mg qD for 10 days.   -Continue with medical management.   -Recommend CDU admission for observation vs medicine admission for medical management of suspected infection.   -Will need outpatient follow up with Dr. White on discharge.  - Plan discussed with attending, Dr. White.    [facility]  [phone]       27 M with no remarkable hx presents with L scrotal pain, found to have epididymal orchitis.    Vitals reviewed, remarkable for tachy to 104, febrile to 100.8, normotensive.  Labs remarkable for leukocytosis WBC 23.95, Cr 0.89. UA negative. Blood cultures pending, urine cultures not ordered.  Ultrasound in the ED with hypervascularity of the L testicle, hypoechoic focus on L epidydimal head, swelling of L epididymus, consistent with epididymal orchitis, blood flow present bilaterally.     Plan  -No acute urological intervention at this time, bilateral flow present in the testicles, no concern for torsion. Swelling and hypervascularity of the epididymus concerning for epididymoorchitis.   -Obtain GC/Chlamydia culture, urine cultures  -Start CTXx1, doxycycline 100mg BID for 10 days for empiric treatment of  infection in his age group.  -If urine culture results are positive for bacterial infection, can prescribe Levofloxacin 500mg qD for 10 days.   -Continue with medical management.   -Recommend CDU admission for observation vs medicine admission for medical management of suspected infection and to trend WBC/fevers.   -Will need outpatient follow up with Dr. White on discharge.  - Plan discussed with attending, Dr. White.    [facility]  [phone]

## 2022-09-01 NOTE — H&P ADULT - HISTORY OF PRESENT ILLNESS
Patient is a 28 y/o M with PMH of fatty liver disease, who presents with L sided abd pain and L testicular pain since 2pm yesterday afternoon. Pt was walking home when he suddenly felt sharp pain in his L abdomen and testicle. Since then, pain has stayed constant in severity, is worse with movement, and has no alleviating factors aside from rest. Pt also endorses headache with nausea but denies vomiting, though has had poor appetite since pain began. Pt denies any fever, SOB, cough, chest pain, dysuria, hematuria, flank pain, urethral discharge or diarrhea. Patient reports he is sexually active with wife only and has no history of STIs. Pt does not recall any recent physical trauma to either the abdomen or testicle. There were no other complains.

## 2022-09-01 NOTE — ED PROVIDER NOTE - ATTENDING CONTRIBUTION TO CARE
patient with sudden onset left tetsicular pain since yesterday. sexually active with wife only. on exam left testicle is moderately enlarged erythematous very tender. normal lies. left lower abdomen with mild tenderness to palpation.

## 2022-09-01 NOTE — H&P ADULT - PROBLEM SELECTOR PLAN 1
p/w fever, abdominal and left testicula pain.  - S/P 1 dose of azithromycin, ceftriaxone and levofloxacin.   - F/U Bcx, Ucx.  - UA negative.  - F/U NAAT Patient p/w fever 100.8 and WBC count of 24k.   - Meets SIRS criteria.  - Lactate - 1.2.  - S/P 1 L bolus.  - S/P 1 dose of azithromycin, ceftriaxone and levofloxacin.  - Start doxycycline.  - C/W IVF. Patient p/w fever 100.8 and WBC count of 24k.   - Meets SIRS criteria.  - Lactate - 1.2.  - S/P 1 L bolus.  - S/P 1 dose of azithromycin, ceftriaxone and levofloxacin.  - Start doxycycline.  - C/W IVF.  - Id consulted - Dr Schofield

## 2022-09-01 NOTE — ED PROVIDER NOTE - OBJECTIVE STATEMENT
28 y/o m with no pertinent pmh who presents with L sided abd pain and L testicular pain since 2pm yesterday afternoon. Pt was walking home when he suddenly felt sharp pain in his L abdomen and testicle. Since then, pain has stayed constant in severity, is worse with movement, and has no alleviating factors aside from rest. Pt also endorses headache with nausea but denies vomiting. Denies changes in bowel movements, though has had poor appetite since pain began. Pt denies any dysuria, hematuria, flank pain, urethral discharge. Is sexually active with wife only and has no history of STIs. Pt does not recall any recent physical trauma to either the abdomen or testicle.

## 2022-09-02 LAB
ALBUMIN SERPL ELPH-MCNC: 3.4 G/DL — LOW (ref 3.5–5)
ALP SERPL-CCNC: 84 U/L — SIGNIFICANT CHANGE UP (ref 40–120)
ALT FLD-CCNC: 126 U/L DA — HIGH (ref 10–60)
ANION GAP SERPL CALC-SCNC: 6 MMOL/L — SIGNIFICANT CHANGE UP (ref 5–17)
AST SERPL-CCNC: 35 U/L — SIGNIFICANT CHANGE UP (ref 10–40)
BILIRUB SERPL-MCNC: 0.7 MG/DL — SIGNIFICANT CHANGE UP (ref 0.2–1.2)
BUN SERPL-MCNC: 12 MG/DL — SIGNIFICANT CHANGE UP (ref 7–18)
C TRACH RRNA SPEC QL NAA+PROBE: SIGNIFICANT CHANGE UP
CALCIUM SERPL-MCNC: 9.1 MG/DL — SIGNIFICANT CHANGE UP (ref 8.4–10.5)
CHLORIDE SERPL-SCNC: 109 MMOL/L — HIGH (ref 96–108)
CO2 SERPL-SCNC: 25 MMOL/L — SIGNIFICANT CHANGE UP (ref 22–31)
CREAT SERPL-MCNC: 0.84 MG/DL — SIGNIFICANT CHANGE UP (ref 0.5–1.3)
CULTURE RESULTS: SIGNIFICANT CHANGE UP
EGFR: 124 ML/MIN/1.73M2 — SIGNIFICANT CHANGE UP
GLUCOSE SERPL-MCNC: 101 MG/DL — HIGH (ref 70–99)
HCT VFR BLD CALC: 44.9 % — SIGNIFICANT CHANGE UP (ref 39–50)
HGB BLD-MCNC: 15.3 G/DL — SIGNIFICANT CHANGE UP (ref 13–17)
MAGNESIUM SERPL-MCNC: 2.4 MG/DL — SIGNIFICANT CHANGE UP (ref 1.6–2.6)
MCHC RBC-ENTMCNC: 30.7 PG — SIGNIFICANT CHANGE UP (ref 27–34)
MCHC RBC-ENTMCNC: 34.1 GM/DL — SIGNIFICANT CHANGE UP (ref 32–36)
MCV RBC AUTO: 90 FL — SIGNIFICANT CHANGE UP (ref 80–100)
N GONORRHOEA RRNA SPEC QL NAA+PROBE: SIGNIFICANT CHANGE UP
NRBC # BLD: 0 /100 WBCS — SIGNIFICANT CHANGE UP (ref 0–0)
PHOSPHATE SERPL-MCNC: 3.7 MG/DL — SIGNIFICANT CHANGE UP (ref 2.5–4.5)
PLATELET # BLD AUTO: 292 K/UL — SIGNIFICANT CHANGE UP (ref 150–400)
POTASSIUM SERPL-MCNC: 4.4 MMOL/L — SIGNIFICANT CHANGE UP (ref 3.5–5.3)
POTASSIUM SERPL-SCNC: 4.4 MMOL/L — SIGNIFICANT CHANGE UP (ref 3.5–5.3)
PROT SERPL-MCNC: 7.6 G/DL — SIGNIFICANT CHANGE UP (ref 6–8.3)
RBC # BLD: 4.99 M/UL — SIGNIFICANT CHANGE UP (ref 4.2–5.8)
RBC # FLD: 13.1 % — SIGNIFICANT CHANGE UP (ref 10.3–14.5)
SODIUM SERPL-SCNC: 140 MMOL/L — SIGNIFICANT CHANGE UP (ref 135–145)
SPECIMEN SOURCE: SIGNIFICANT CHANGE UP
SPECIMEN SOURCE: SIGNIFICANT CHANGE UP
WBC # BLD: 14.72 K/UL — HIGH (ref 3.8–10.5)
WBC # FLD AUTO: 14.72 K/UL — HIGH (ref 3.8–10.5)

## 2022-09-02 PROCEDURE — 99233 SBSQ HOSP IP/OBS HIGH 50: CPT

## 2022-09-02 RX ADMIN — Medication 110 MILLIGRAM(S): at 06:25

## 2022-09-02 RX ADMIN — Medication 650 MILLIGRAM(S): at 00:55

## 2022-09-02 RX ADMIN — Medication 110 MILLIGRAM(S): at 18:08

## 2022-09-02 RX ADMIN — PANTOPRAZOLE SODIUM 40 MILLIGRAM(S): 20 TABLET, DELAYED RELEASE ORAL at 06:25

## 2022-09-02 RX ADMIN — SODIUM CHLORIDE 80 MILLILITER(S): 9 INJECTION INTRAMUSCULAR; INTRAVENOUS; SUBCUTANEOUS at 06:26

## 2022-09-02 RX ADMIN — Medication 650 MILLIGRAM(S): at 01:43

## 2022-09-02 NOTE — PROGRESS NOTE ADULT - PROBLEM SELECTOR PLAN 1
p/w fever, abdominal and left testicular pain.  - S/P 1 dose of azithromycin, ceftriaxone and levofloxacin.   - F/U Bcx, Ucx.  - UA negative.  - F/U NAAT  - Urology consulted.

## 2022-09-02 NOTE — PROGRESS NOTE ADULT - PROBLEM SELECTOR PLAN 2
Patient p/w fever 100.8 and WBC count of 24k.   - Meets SIRS criteria.  - Lactate - 1.2.  - S/P 1 L bolus.  - S/P 1 dose of azithromycin, ceftriaxone and levofloxacin.  - c/w doxycycline.  - C/W IVF.  - Id consulted - Dr Schofield.

## 2022-09-02 NOTE — PROGRESS NOTE ADULT - SUBJECTIVE AND OBJECTIVE BOX
`Patient is a 27y old  Male who presents with a chief complaint of Epididymo-orchitis (02 Sep 2022 07:50)    pt seen in ed [x], reg med floor [   ], bed [x  ], chair at bedside [   ], a+o x3 [x  ], lethargic [  ],  nad [x  ]    diggs [  ], ngt [  ], peg [  ], et tube [  ], cent line [  ], picc line [  ]        Allergies    No Known Allergies        Vitals    T(F): 97.7 (09-02-22 @ 08:04), Max: 101 (09-01-22 @ 23:49)  HR: 73 (09-02-22 @ 08:04) (57 - 104)  BP: 115/74 (09-02-22 @ 08:04) (102/65 - 131/73)  RR: 18 (09-02-22 @ 08:04) (17 - 18)  SpO2: 98% (09-02-22 @ 08:04) (96% - 98%)  Wt(kg): --  CAPILLARY BLOOD GLUCOSE          Labs                          15.3   14.72 )-----------( 292      ( 02 Sep 2022 06:36 )             44.9       09-02    140  |  109<H>  |  12  ----------------------------<  101<H>  4.4   |  25  |  0.84    Ca    9.1      02 Sep 2022 06:36  Phos  3.7     09-02  Mg     2.4     09-02    TPro  7.6  /  Alb  3.4<L>  /  TBili  0.7  /  DBili  x   /  AST  35  /  ALT  126<H>  /  AlkPhos  84  09-02                Radiology Results      Meds    MEDICATIONS  (STANDING):  doxycycline IVPB      doxycycline IVPB 100 milliGRAM(s) IV Intermittent every 12 hours  pantoprazole    Tablet 40 milliGRAM(s) Oral before breakfast  sodium chloride 0.9%. 1000 milliLiter(s) (80 mL/Hr) IV Continuous <Continuous>      MEDICATIONS  (PRN):  acetaminophen     Tablet .. 650 milliGRAM(s) Oral every 6 hours PRN Temp greater or equal to 38C (100.4F), Mild Pain (1 - 3)  ketorolac   Injectable 30 milliGRAM(s) IV Push every 6 hours PRN Moderate Pain (4 - 6)      Physical Exam    Neuro :  no focal deficits  Respiratory: CTA B/L  CV: RRR, S1S2, no murmurs,   Abdominal: Soft, NT, ND +BS,  Extremities: No edema, + peripheral pulses        ASSESSMENT    sepsis,   epididymoorchitis,  h/o fatty liver disease  cervical spine surgery        PLAN    Continue doxycycline 100mg BID for 10 days for empiric treatment of  infection in his age group.  ua neg   id cons  f/u bld cx, ucx, gonococcus /chlamydia,   urology f/u noted   No acute urological intervention   If urine culture results are positive for bacterial infection, can prescribe Levofloxacin 500mg qD for 10 days.   Will need outpatient follow up with Dr. White on discharge.  cont current meds

## 2022-09-02 NOTE — PROGRESS NOTE ADULT - SUBJECTIVE AND OBJECTIVE BOX
NP Note discussed with Primary Attending.    Patient is a 27y old  Male who presents with a chief complaint of Epididymo-orchitis (02 Sep 2022 08:14)      INTERVAL HPI/OVERNIGHT EVENTS: no new complaints    MEDICATIONS  (STANDING):  doxycycline IVPB      doxycycline IVPB 100 milliGRAM(s) IV Intermittent every 12 hours  pantoprazole    Tablet 40 milliGRAM(s) Oral before breakfast  sodium chloride 0.9%. 1000 milliLiter(s) (80 mL/Hr) IV Continuous <Continuous>    MEDICATIONS  (PRN):  acetaminophen     Tablet .. 650 milliGRAM(s) Oral every 6 hours PRN Temp greater or equal to 38C (100.4F), Mild Pain (1 - 3)  ketorolac   Injectable 30 milliGRAM(s) IV Push every 6 hours PRN Moderate Pain (4 - 6)      __________________________________________________  REVIEW OF SYSTEMS:    CONSTITUTIONAL: No fever,   EYES: no acute visual disturbances  NECK: No pain or stiffness  RESPIRATORY: No cough; No shortness of breath  CARDIOVASCULAR: No chest pain, no palpitations  GASTROINTESTINAL: No pain. No nausea or vomiting; No diarrhea   NEUROLOGICAL: No headache or numbness, no tremors  MUSCULOSKELETAL: No joint pain, no muscle pain  GENITOURINARY: Left testicular pain, no dysuria, no frequency, no hesitancy  PSYCHIATRY: no depression , no anxiety  ALL OTHER  ROS negative        Vital Signs Last 24 Hrs  T(C): 37 (02 Sep 2022 12:21), Max: 38.3 (01 Sep 2022 23:49)  T(F): 98.6 (02 Sep 2022 12:21), Max: 101 (01 Sep 2022 23:49)  HR: 76 (02 Sep 2022 12:21) (57 - 99)  BP: 116/75 (02 Sep 2022 12:21) (102/65 - 129/81)  BP(mean): --  RR: 18 (02 Sep 2022 08:04) (18 - 18)  SpO2: 98% (02 Sep 2022 12:21) (96% - 98%)    Parameters below as of 02 Sep 2022 12:21  Patient On (Oxygen Delivery Method): room air        ________________________________________________  PHYSICAL EXAM:  GENERAL: NAD  HEENT: Normocephalic;  conjunctivae and sclerae clear; moist mucous membranes;   NECK : supple  CHEST/LUNG: Clear to auscultation bilaterally with good air entry   HEART: S1 S2  regular; no murmurs, gallops or rubs  ABDOMEN: Soft, Nontender, Nondistended; Bowel sounds present  EXTREMITIES: no cyanosis; no edema; no calf tenderness  SKIN: warm and dry; no rash  NERVOUS SYSTEM:  Awake and alert; Oriented  to place, person and time ; no new deficits    _________________________________________________  LABS:                        15.3   14.72 )-----------( 292      ( 02 Sep 2022 06:36 )             44.9         140  |  109<H>  |  12  ----------------------------<  101<H>  4.4   |  25  |  0.84    Ca    9.1      02 Sep 2022 06:36  Phos  3.7       Mg     2.4         TPro  7.6  /  Alb  3.4<L>  /  TBili  0.7  /  DBili  x   /  AST  35  /  ALT  126<H>  /  AlkPhos  84        Urinalysis Basic - ( 01 Sep 2022 12:47 )    Color: Yellow / Appearance: Clear / S.015 / pH: x  Gluc: x / Ketone: Negative  / Bili: Negative / Urobili: Negative   Blood: x / Protein: 30 mg/dL / Nitrite: Negative   Leuk Esterase: Negative / RBC: Negative /HPF / WBC 0-2 /HPF   Sq Epi: x / Non Sq Epi: x / Bacteria: Trace /HPF      CAPILLARY BLOOD GLUCOSE            RADIOLOGY & ADDITIONAL TESTS:  ACC: [id number] EXAM:  US SCROTUM AND CONTENTS                          PROCEDURE DATE:  2022          INTERPRETATION:  CLINICAL INFORMATION: Left testicular pain and swelling    COMPARISON: None available.    TECHNIQUE: Testicular ultrasound utilizing color and spectral Doppler.    FINDINGS:    RIGHT:  Right testis: 4.1 x 2.2 x  2.1 cm. Normal echogenicity and echotexture   with no masses or areas of architectural distortion. Normal arterial and   venous blood flow pattern.  Right epididymis: There is a 0.5 cm cyst within the epididymal head.   Additional 0.8 cm hypoechoic focus in the epididymal head, uncertain   significance.  Right hydrocele: Trace.  Right varicocele: None.    LEFT:  Left testis: 4.1 x 2.3 x 3.1 cm. Normal echogenicity and echotexture with   no masses or areas of architectural distortion. Increased vascularity   with color and spectral Doppler.  Left epididymis: Enlarged and heterogeneous with increased vascularity.  Left hydrocele: Trace.  Left varicocele: None.    IMPRESSION:    Enlarged, heterogeneous left epididymis with increased vascularity of the   left epididymis and testicle, most compatible with epididymoorchitis.    There is a 0.8 cm hypoechoic focus in the right epididymal head of   uncertain significance. One month follow-up testicular ultrasound is   recommended to assure resolution.    Trace bilateral hydroceles.        --- End of Report ---            PATRICK WEBB MD; Attending Radiologist  This document has been electronically signed. Sep  1 2022 12:49PM      Imaging  Reviewed:  YES/NO    Consultant(s) Notes Reviewed:   YES/ No      Plan of care was discussed with patient and /or primary care giver; all questions and concerns were addressed

## 2022-09-02 NOTE — PROGRESS NOTE ADULT - SUBJECTIVE AND OBJECTIVE BOX
Surgery Progress Note     Subjective/24hour Events:   Patient seen and examined.   No acute events overnight. Doing well.  Pain improved.     Vital Signs:  Vital Signs Last 24 Hrs  T(C): 36.7 (02 Sep 2022 04:55), Max: 38.3 (01 Sep 2022 23:49)  T(F): 98 (02 Sep 2022 04:55), Max: 101 (01 Sep 2022 23:49)  HR: 57 (02 Sep 2022 04:55) (57 - 104)  BP: 102/65 (02 Sep 2022 04:55) (102/65 - 131/73)  BP(mean): --  RR: 18 (02 Sep 2022 04:55) (17 - 18)  SpO2: 98% (02 Sep 2022 04:55) (96% - 98%)    Parameters below as of 02 Sep 2022 04:55  Patient On (Oxygen Delivery Method): room air        CAPILLARY BLOOD GLUCOSE          I&O's Detail      MEDICATIONS  (STANDING):  doxycycline IVPB      doxycycline IVPB 100 milliGRAM(s) IV Intermittent every 12 hours  pantoprazole    Tablet 40 milliGRAM(s) Oral before breakfast  sodium chloride 0.9%. 1000 milliLiter(s) (80 mL/Hr) IV Continuous <Continuous>    MEDICATIONS  (PRN):  acetaminophen     Tablet .. 650 milliGRAM(s) Oral every 6 hours PRN Temp greater or equal to 38C (100.4F), Mild Pain (1 - 3)  ketorolac   Injectable 30 milliGRAM(s) IV Push every 6 hours PRN Moderate Pain (4 - 6)      Physical Exam:  Gen: NAD.  Lungs: Non labored breathing.   Ab: Soft, nontender, nondistended.   : Exam stable    Labs:    09-02    x   |  x   |  x   ----------------------------<  x   x    |  25  |  0.84    Ca    9.1      02 Sep 2022 06:36  Phos  3.7     09-02  Mg     2.4     09-02    TPro  7.6  /  Alb  x   /  TBili  0.7  /  DBili  x   /  AST  35  /  ALT  126<H>  /  AlkPhos  84  09-02    LIVER FUNCTIONS - ( 02 Sep 2022 06:36 )  Alb: x     / Pro: 7.6 g/dL / ALK PHOS: 84 U/L / ALT: 126 U/L DA / AST: 35 U/L / GGT: x                                 15.3   14.72 )-----------( 292      ( 02 Sep 2022 06:36 )             44.9          Surgery Progress Note     Subjective/24hour Events:   Patient seen and examined.   No acute events overnight. Doing well.  Pain improved.     Vital Signs:  Vital Signs Last 24 Hrs  T(C): 36.7 (02 Sep 2022 04:55), Max: 38.3 (01 Sep 2022 23:49)  T(F): 98 (02 Sep 2022 04:55), Max: 101 (01 Sep 2022 23:49)  HR: 57 (02 Sep 2022 04:55) (57 - 104)  BP: 102/65 (02 Sep 2022 04:55) (102/65 - 131/73)  BP(mean): --  RR: 18 (02 Sep 2022 04:55) (17 - 18)  SpO2: 98% (02 Sep 2022 04:55) (96% - 98%)    Parameters below as of 02 Sep 2022 04:55  Patient On (Oxygen Delivery Method): room air        CAPILLARY BLOOD GLUCOSE          I&O's Detail      MEDICATIONS  (STANDING):  doxycycline IVPB      doxycycline IVPB 100 milliGRAM(s) IV Intermittent every 12 hours  pantoprazole    Tablet 40 milliGRAM(s) Oral before breakfast  sodium chloride 0.9%. 1000 milliLiter(s) (80 mL/Hr) IV Continuous <Continuous>    MEDICATIONS  (PRN):  acetaminophen     Tablet .. 650 milliGRAM(s) Oral every 6 hours PRN Temp greater or equal to 38C (100.4F), Mild Pain (1 - 3)  ketorolac   Injectable 30 milliGRAM(s) IV Push every 6 hours PRN Moderate Pain (4 - 6)      Physical Exam:  Gen: NAD.  Lungs: Non labored breathing.   Ab: Soft, nontender, nondistended.   :  L testicular tenderness improved, otherwise stable    Labs:    09-02    x   |  x   |  x   ----------------------------<  x   x    |  25  |  0.84    Ca    9.1      02 Sep 2022 06:36  Phos  3.7     09-02  Mg     2.4     09-02    TPro  7.6  /  Alb  x   /  TBili  0.7  /  DBili  x   /  AST  35  /  ALT  126<H>  /  AlkPhos  84  09-02    LIVER FUNCTIONS - ( 02 Sep 2022 06:36 )  Alb: x     / Pro: 7.6 g/dL / ALK PHOS: 84 U/L / ALT: 126 U/L DA / AST: 35 U/L / GGT: x                                 15.3   14.72 )-----------( 292      ( 02 Sep 2022 06:36 )             44.9

## 2022-09-02 NOTE — PROGRESS NOTE ADULT - ASSESSMENT
27 M with no remarkable hx presents with L scrotal pain, found to have epididymal orchitis.    Vitals reviewed, tachy resolved, febrile to 101 at 11PM 10/1, .  Labs remarkable for leukocytosis WBC improved 14 from 23.95, Cr stable 0.84, from 0.89. Blood cultures pending, urine cultures/GC/Chlamydia pending.    Plan  -No acute urological intervention   -F/u GC/Chlamydia culture, urine cultures  -Continue doxycycline 100mg BID for 10 days for empiric treatment of  infection in his age group.  -If urine culture results are positive for bacterial infection, can prescribe Levofloxacin 500mg qD for 10 days.   -Continue with medical management.   -Will need outpatient follow up with Dr. White on discharge.    [facility]  [phone]       27 M with no remarkable hx presents with L scrotal pain, found to have epididymal orchitis.    Much improved today.  Vitals reviewed, tachy resolved, febrile to 101 at 11PM 10/1, .  Labs remarkable for leukocytosis WBC improved 14 from 23.95, Cr stable 0.84, from 0.89. Blood cultures pending, urine cultures pending. GC/Chlamydia negative    Plan  -F/u urine cultures.         If Ucx positive, treat with Levofloxacin 500 mg qd for 10 day total abx course. Tailor to culture if not sensitive         If Ucx negative, continue doxycyclin 100 mg BID x 10 days for empiric treatment of GC/chlamydia  -acetaminophen and NSAIDs for pain control  -Outpatient follow up with Dr. White in 7-10 days    [facility]  [phone]

## 2022-09-02 NOTE — PROGRESS NOTE ADULT - ASSESSMENT
Patient is a 28 y/o M with PMH of fatty liver disease, who presents with L sided abd pain and L testicular pain x 1 day.  testicle. Since then, pain has stayed constant in severity, is worse with movement, and has no alleviating factors aside from rest. Pt also endorses headache with nausea but denies vomiting, though has had poor appetite since pain began. Pt does not recall any recent physical trauma to either the abdomen or testicle. Patient admitted to medicine for epididymoorchitis. Urology consulted for recommendations, US testicles show Enlarged, heterogeneous left epididymis with increased vascularity of the   left epididymis and testicle, most compatible with epididymoorchitis. Patient was started on Doxycycline, and U. cx and GC/chlamydia were sent to lab.     Plan:  -F/u urine cultures.         If Ucx positive, treat with Levofloxacin 500 mg qd for 10 day total abx course. Tailor to culture if not sensitive         If Ucx negative, continue doxycyclin 100 mg BID x 10 days for empiric treatment of GC/chlamydia  -acetaminophen and NSAIDs for pain control  -Outpatient follow up with Dr. White in 7-10 days

## 2022-09-03 ENCOUNTER — TRANSCRIPTION ENCOUNTER (OUTPATIENT)
Age: 27
End: 2022-09-03

## 2022-09-03 VITALS
OXYGEN SATURATION: 99 % | HEART RATE: 80 BPM | TEMPERATURE: 98 F | RESPIRATION RATE: 17 BRPM | SYSTOLIC BLOOD PRESSURE: 123 MMHG | DIASTOLIC BLOOD PRESSURE: 69 MMHG

## 2022-09-03 LAB
ALBUMIN SERPL ELPH-MCNC: 3.4 G/DL — LOW (ref 3.5–5)
ALP SERPL-CCNC: 79 U/L — SIGNIFICANT CHANGE UP (ref 40–120)
ALT FLD-CCNC: 112 U/L DA — HIGH (ref 10–60)
ANION GAP SERPL CALC-SCNC: 4 MMOL/L — LOW (ref 5–17)
AST SERPL-CCNC: 45 U/L — HIGH (ref 10–40)
BILIRUB SERPL-MCNC: 0.5 MG/DL — SIGNIFICANT CHANGE UP (ref 0.2–1.2)
BUN SERPL-MCNC: 12 MG/DL — SIGNIFICANT CHANGE UP (ref 7–18)
CALCIUM SERPL-MCNC: 9.4 MG/DL — SIGNIFICANT CHANGE UP (ref 8.4–10.5)
CHLORIDE SERPL-SCNC: 111 MMOL/L — HIGH (ref 96–108)
CO2 SERPL-SCNC: 26 MMOL/L — SIGNIFICANT CHANGE UP (ref 22–31)
CREAT SERPL-MCNC: 0.85 MG/DL — SIGNIFICANT CHANGE UP (ref 0.5–1.3)
EGFR: 124 ML/MIN/1.73M2 — SIGNIFICANT CHANGE UP
GLUCOSE SERPL-MCNC: 100 MG/DL — HIGH (ref 70–99)
HCT VFR BLD CALC: 43.6 % — SIGNIFICANT CHANGE UP (ref 39–50)
HGB BLD-MCNC: 14.9 G/DL — SIGNIFICANT CHANGE UP (ref 13–17)
MCHC RBC-ENTMCNC: 30 PG — SIGNIFICANT CHANGE UP (ref 27–34)
MCHC RBC-ENTMCNC: 34.2 GM/DL — SIGNIFICANT CHANGE UP (ref 32–36)
MCV RBC AUTO: 87.9 FL — SIGNIFICANT CHANGE UP (ref 80–100)
NRBC # BLD: 0 /100 WBCS — SIGNIFICANT CHANGE UP (ref 0–0)
PLATELET # BLD AUTO: 307 K/UL — SIGNIFICANT CHANGE UP (ref 150–400)
POTASSIUM SERPL-MCNC: 4.4 MMOL/L — SIGNIFICANT CHANGE UP (ref 3.5–5.3)
POTASSIUM SERPL-SCNC: 4.4 MMOL/L — SIGNIFICANT CHANGE UP (ref 3.5–5.3)
PROT SERPL-MCNC: 7.5 G/DL — SIGNIFICANT CHANGE UP (ref 6–8.3)
RBC # BLD: 4.96 M/UL — SIGNIFICANT CHANGE UP (ref 4.2–5.8)
RBC # FLD: 13.2 % — SIGNIFICANT CHANGE UP (ref 10.3–14.5)
SODIUM SERPL-SCNC: 141 MMOL/L — SIGNIFICANT CHANGE UP (ref 135–145)
WBC # BLD: 13.82 K/UL — HIGH (ref 3.8–10.5)
WBC # FLD AUTO: 13.82 K/UL — HIGH (ref 3.8–10.5)

## 2022-09-03 PROCEDURE — 80053 COMPREHEN METABOLIC PANEL: CPT

## 2022-09-03 PROCEDURE — 36415 COLL VENOUS BLD VENIPUNCTURE: CPT

## 2022-09-03 PROCEDURE — 87040 BLOOD CULTURE FOR BACTERIA: CPT

## 2022-09-03 PROCEDURE — 96372 THER/PROPH/DIAG INJ SC/IM: CPT | Mod: XU

## 2022-09-03 PROCEDURE — 87491 CHLMYD TRACH DNA AMP PROBE: CPT

## 2022-09-03 PROCEDURE — 87086 URINE CULTURE/COLONY COUNT: CPT

## 2022-09-03 PROCEDURE — 81001 URINALYSIS AUTO W/SCOPE: CPT

## 2022-09-03 PROCEDURE — 85027 COMPLETE CBC AUTOMATED: CPT

## 2022-09-03 PROCEDURE — 83605 ASSAY OF LACTIC ACID: CPT

## 2022-09-03 PROCEDURE — 76870 US EXAM SCROTUM: CPT

## 2022-09-03 PROCEDURE — 85025 COMPLETE CBC W/AUTO DIFF WBC: CPT

## 2022-09-03 PROCEDURE — 84100 ASSAY OF PHOSPHORUS: CPT

## 2022-09-03 PROCEDURE — 83735 ASSAY OF MAGNESIUM: CPT

## 2022-09-03 PROCEDURE — 99285 EMERGENCY DEPT VISIT HI MDM: CPT

## 2022-09-03 PROCEDURE — 87591 N.GONORRHOEAE DNA AMP PROB: CPT

## 2022-09-03 PROCEDURE — 96374 THER/PROPH/DIAG INJ IV PUSH: CPT

## 2022-09-03 PROCEDURE — 96375 TX/PRO/DX INJ NEW DRUG ADDON: CPT

## 2022-09-03 RX ORDER — ACETAMINOPHEN 500 MG
2 TABLET ORAL
Qty: 0 | Refills: 0 | DISCHARGE
Start: 2022-09-03

## 2022-09-03 RX ORDER — PANTOPRAZOLE SODIUM 20 MG/1
1 TABLET, DELAYED RELEASE ORAL
Qty: 10 | Refills: 0
Start: 2022-09-03 | End: 2022-09-12

## 2022-09-03 RX ADMIN — Medication 110 MILLIGRAM(S): at 06:01

## 2022-09-03 RX ADMIN — SODIUM CHLORIDE 80 MILLILITER(S): 9 INJECTION INTRAMUSCULAR; INTRAVENOUS; SUBCUTANEOUS at 06:01

## 2022-09-03 RX ADMIN — PANTOPRAZOLE SODIUM 40 MILLIGRAM(S): 20 TABLET, DELAYED RELEASE ORAL at 09:24

## 2022-09-03 NOTE — DISCHARGE NOTE NURSING/CASE MANAGEMENT/SOCIAL WORK - NSDCPEFALRISK_GEN_ALL_CORE
For information on Fall & Injury Prevention, visit: [email or web address] OR  [email or web address] OR  https://www.cdc.gov/steadi/patient.html

## 2022-09-03 NOTE — DISCHARGE NOTE PROVIDER - HOSPITAL COURSE
Patient is a 28 y/o M with PMH of fatty liver disease, who presents with L sided abd pain and L testicular pain x 1 day.  testicle. Since then, pain has stayed constant in severity, is worse with movement, and has no alleviating factors aside from rest. Pt also endorses headache with nausea but denies vomiting, though has had poor appetite since pain began. Pt does not recall any recent physical trauma to either the abdomen or testicle. Patient admitted to medicine for epididymoorchitis. Urology consulted for recommendations, US testicles show Enlarged, heterogeneous left epididymis with increased vascularity of the left epididymis and testicle, most compatible with epididymoorchitis. Patient was started on Doxycycline.   UA/Ucx negative, GC/chlamydia negative. No acute urologic intervention is needed this time per urology. Patient symptoms are improving.   Patient is medically optimized for discharge home with po abt to complete total 10days and urology follow up.   Please refer to medical records and progress/consult notes for in depth hospital course.

## 2022-09-03 NOTE — DISCHARGE NOTE PROVIDER - NSDCCPCAREPLAN_GEN_ALL_CORE_FT
PRINCIPAL DISCHARGE DIAGNOSIS  Diagnosis: Epididymoorchitis  Assessment and Plan of Treatment: You presented with symptoms with testicular pain with swelling. Ultrasound testicles show Enlarged, heterogeneous left epididymis with increased vascularity of the left epididymis and testicle, most ompatible with epididymoorchitis. You were followed by urologist, treated with IV antibiotic. Urine culture and GC/Clamydia test negative.   Continue Doxycycline 100mg twice a day x 10 days. take with food.   Follow up with urologist in one week after discharge.

## 2022-09-03 NOTE — DISCHARGE NOTE PROVIDER - CARE PROVIDER_API CALL
Gibran Grant)  [facility] [id number]  Phone: [phone]  Fax: [phone]  Follow Up Time: Routine    Burton White)  Urology  [address] [place], [place] [address]  Phone: [phone]  Fax: [phone]  Follow Up Time: 1 week

## 2022-09-03 NOTE — DISCHARGE NOTE PROVIDER - PROVIDER TOKENS
PROVIDER:[TOKEN:[[id number]],FOLLOWUP:[Routine]],PROVIDER:[TOKEN:[[id number]],FOLLOWUP:[1 week]]

## 2022-09-03 NOTE — DISCHARGE NOTE NURSING/CASE MANAGEMENT/SOCIAL WORK - PATIENT PORTAL LINK FT
You can access the FollowMyHealth Patient Portal offered by [facility] by registering at the following website: [email or web address]. By joining [employer]’s FollowMyHealth portal, you will also be able to view your health information using other applications (apps) compatible with our system.

## 2022-09-03 NOTE — PROGRESS NOTE ADULT - SUBJECTIVE AND OBJECTIVE BOX
Patient is a 27y old  Male who presents with a chief complaint of Epididymo-orchitis (02 Sep 2022 14:32)    pt seen in ed [x], reg med floor [   ], bed [x  ], chair at bedside [   ], a+o x3 [x  ], lethargic [  ],  nad [x  ]    diggs [  ], ngt [  ], peg [  ], et tube [  ], cent line [  ], picc line [  ]      Allergies    No Known Allergies        Vitals    T(F): 98.5 (09-03-22 @ 04:35), Max: 99.3 (09-03-22 @ 00:00)  HR: 64 (09-03-22 @ 04:35) (64 - 80)  BP: 111/71 (09-03-22 @ 04:35) (111/71 - 123/76)  RR: 18 (09-03-22 @ 04:35) (17 - 18)  SpO2: 98% (09-03-22 @ 04:35) (98% - 98%)  Wt(kg): --  CAPILLARY BLOOD GLUCOSE          Labs                          14.9   13.82 )-----------( 307      ( 03 Sep 2022 05:43 )             43.6       09-03    141  |  111<H>  |  12  ----------------------------<  100<H>  4.4   |  26  |  0.85    Ca    9.4      03 Sep 2022 05:43  Phos  3.7     09-02  Mg     2.4     09-02    TPro  7.5  /  Alb  3.4<L>  /  TBili  0.5  /  DBili  x   /  AST  45<H>  /  ALT  112<H>  /  AlkPhos  79  09-03            Clean Catch Clean Catch (Midstream)  09-01 @ 15:31   <10,000 CFU/mL Normal Urogenital Karon  --  --      .Blood Blood-Peripheral  09-01 @ 12:58   No growth to date.  --  --      .Blood Blood-Peripheral  09-01 @ 12:48   No growth to date.  --  --          Radiology Results      Meds    MEDICATIONS  (STANDING):  doxycycline IVPB      doxycycline IVPB 100 milliGRAM(s) IV Intermittent every 12 hours  pantoprazole    Tablet 40 milliGRAM(s) Oral before breakfast      MEDICATIONS  (PRN):  acetaminophen     Tablet .. 650 milliGRAM(s) Oral every 6 hours PRN Temp greater or equal to 38C (100.4F), Mild Pain (1 - 3)  ketorolac   Injectable 30 milliGRAM(s) IV Push every 6 hours PRN Moderate Pain (4 - 6)      Physical Exam    Neuro :  no focal deficits  Respiratory: CTA B/L  CV: RRR, S1S2, no murmurs,   Abdominal: Soft, NT, ND +BS,  Extremities: No edema, + peripheral pulses        ASSESSMENT    sepsis,   epididymoorchitis,  h/o fatty liver disease  cervical spine surgery        PLAN    Continue doxycycline 100mg BID for 10 days for empiric treatment of  infection in his age group.  ua neg   id cons  f/u bld cx, ucx, gonococcus /chlamydia,   urology f/u noted   No acute urological intervention   If urine culture results are positive for bacterial infection, can prescribe Levofloxacin 500mg qD for 10 days.   Will need outpatient follow up with Dr. White on discharge.  cont current meds       Patient is a 27y old  Male who presents with a chief complaint of Epididymo-orchitis (02 Sep 2022 14:32)    pt seen in ed [x], reg med floor [   ], bed [x  ], chair at bedside [   ], a+o x3 [x  ], lethargic [  ],  nad [x  ]    Allergies    No Known Allergies        Vitals    T(F): 98.5 (09-03-22 @ 04:35), Max: 99.3 (09-03-22 @ 00:00)  HR: 64 (09-03-22 @ 04:35) (64 - 80)  BP: 111/71 (09-03-22 @ 04:35) (111/71 - 123/76)  RR: 18 (09-03-22 @ 04:35) (17 - 18)  SpO2: 98% (09-03-22 @ 04:35) (98% - 98%)  Wt(kg): --  CAPILLARY BLOOD GLUCOSE          Labs                          14.9   13.82 )-----------( 307      ( 03 Sep 2022 05:43 )             43.6       09-03    141  |  111<H>  |  12  ----------------------------<  100<H>  4.4   |  26  |  0.85    Ca    9.4      03 Sep 2022 05:43  Phos  3.7     09-02  Mg     2.4     09-02    TPro  7.5  /  Alb  3.4<L>  /  TBili  0.5  /  DBili  x   /  AST  45<H>  /  ALT  112<H>  /  AlkPhos  79  09-03    Chlamydia/GC Nucleic Acid Amplification (09.01.22 @ 15:31)   Chlamydia Amplification Result: NotDetec:   GC Amplification Result: NotDetec:        Clean Catch Clean Catch (Midstream)  09-01 @ 15:31   <10,000 CFU/mL Normal Urogenital Karon  --  --      .Blood Blood-Peripheral  09-01 @ 12:58   No growth to date.  --  --      .Blood Blood-Peripheral  09-01 @ 12:48   No growth to date.  --  --          Radiology Results      Meds    MEDICATIONS  (STANDING):  doxycycline IVPB      doxycycline IVPB 100 milliGRAM(s) IV Intermittent every 12 hours  pantoprazole    Tablet 40 milliGRAM(s) Oral before breakfast      MEDICATIONS  (PRN):  acetaminophen     Tablet .. 650 milliGRAM(s) Oral every 6 hours PRN Temp greater or equal to 38C (100.4F), Mild Pain (1 - 3)  ketorolac   Injectable 30 milliGRAM(s) IV Push every 6 hours PRN Moderate Pain (4 - 6)      Physical Exam    Neuro :  no focal deficits  Respiratory: CTA B/L  CV: RRR, S1S2, no murmurs,   Abdominal: Soft, NT, ND +BS,   :  L testicular tenderness resolved, no swelling or erythema,, otherwise stable  Extremities: No edema, + peripheral pulses        ASSESSMENT    sepsis,   epididymoorchitis,  h/o fatty liver disease  cervical spine surgery        PLAN    ua neg   bld cx, ucx, gonococcus /chlamydia neg noted above,   urology f/u noted   No acute urological intervention   continue doxycyclin 100 mg BID x 10 days for empiric treatment of GC/chlamydia  -acetaminophen and NSAIDs for pain control  -Outpatient follow up with Dr. White urologist in 7-10 days  cont current meds  pt stable for d/c

## 2022-09-03 NOTE — DISCHARGE NOTE PROVIDER - NSDCMRMEDTOKEN_GEN_ALL_CORE_FT
acetaminophen 325 mg oral tablet: 2 tab(s) orally every 6 hours, As needed, Temp greater or equal to 38C (100.4F), Mild Pain (1 - 3)  doxycycline hyclate 100 mg oral capsule: 1 cap(s) orally 2 times a day   hydrOXYzine hydrochloride 25 mg oral tablet: 1 tab(s) orally every 6 hours, As Needed -for itching - for rash   pantoprazole 40 mg oral delayed release tablet: 1 tab(s) orally once a day (before a meal)  predniSONE 20 mg oral tablet: 2 tab(s) orally once a day. Take in the morning with food

## 2022-09-06 LAB
CULTURE RESULTS: SIGNIFICANT CHANGE UP
CULTURE RESULTS: SIGNIFICANT CHANGE UP
SPECIMEN SOURCE: SIGNIFICANT CHANGE UP
SPECIMEN SOURCE: SIGNIFICANT CHANGE UP

## 2022-09-12 ENCOUNTER — APPOINTMENT (OUTPATIENT)
Dept: UROLOGY | Facility: CLINIC | Age: 27
End: 2022-09-12

## 2022-09-12 PROBLEM — Z00.00 ENCOUNTER FOR PREVENTIVE HEALTH EXAMINATION: Status: ACTIVE | Noted: 2022-09-12

## 2022-10-26 NOTE — ED PROVIDER NOTE - SKIN, MLM
The Open Access order in the GI workqueue requested on 5/10/22  by Milton Woodward MD has been removed as, unable to contact.   Ordering provider has been notified.     If patient returns call GI will reinstate the order.    Please contact patient, if further communication is needed.   Skin normal color for race, warm, dry and intact. No evidence of rash.

## 2022-12-31 NOTE — ED ADULT NURSE NOTE - NS ED PATIENT SAFETY CONCERN
You were seen in the [facility] ED for joint pain related to your arthritis. While you were here you were given pain medication. You should continue to use tylenol for your pain. You can also buy voltaren (diclofenac) gel over-the-counter which can be put on your sore joints. We are prescribing oxycodone which can be used for severe, breakthrough pain. Be aware that this medication can cause drowsiness. Follow-up with your primary care doctor. Return to the ED for fevers, severe worsening pain, inability to walk, or other concerns. Unable to assess due to medical condition

## 2023-03-13 NOTE — ED ADULT NURSE NOTE - SUICIDE SCREENING QUESTION 3
March 13, 2023     Patient: Marita Schwartz   YOB: 1956   Date of Visit: 3/13/2023       To Whom it May Concern:    Marita Schwartz was seen in my clinic on 3/13/2023 at 3:00 pm.    Please excuse Marita for her absence from work on Please excuse Marita for her absence from work on 3/14/ 2023 due to her current condition. She is able to return to work on 3/17/2023.      Sincerely,         Stevie Magana MD    Medical information is confidential and cannot be disclosed without the written consent of the patient or her representative.       Patient unable to complete

## 2023-10-04 NOTE — ED PROVIDER NOTE - ATTENDING APP SHARED VISIT CONTRIBUTION OF CARE
Results letter mailed to patient per   Colon recall entered for repeat in 7 yrs ,9/7/2030  Colon done in 9/7/2023   Updated and Patient Outreach was placed for Colon recall   Ernie Dawn MD  [facility] Clinical Staff  GI RNs - 1.  Please print and mail this letter to patient; 2. Recall for colonoscopy exam in 7 years 28 y/o male with chest pain radiating to upper back x3 days, will check labs including troponin and d-dimer, will get chest x-ray, EKG and reassess.

## 2025-07-20 NOTE — ED ADULT NURSE NOTE - NS ED NURSE DISCH DISPOSITION
[FreeTextEntry1] : 07/08/2025: 79-year-old male presents for follow-up.  Patient had come into the office for post-void residual check in 04/2025.   Since then, has been on Flomax 2 capsules.  Patient mentions that he has noticed improved urination with better flow.   However, is having no ejaculation.   Patient did do PSA last month and recent repeat. Did abstain for 48 hours before the blood test.   Seen on 03/25/2025 for follow-up. PSA: 7.92/20% (2/25/2025). MRI prostate (3/20/2025): Prostate volume: 70 cc, previously 71 cc. Patchy inflammatory type enhancement throughout the peripheral zone. No MRI targetable lesions. PI-RADS 2.  Seen on 2/11/2025 for Elevated PSA.   Recent PSA with PCP went up.  Denied any recent unintentional weight loss, night sweats and new bone or back pain.  Had family history of Prostate cancer: Father and Brother.  Patient had not had Prostate biopsy. Had MRI Prostate in the past.  On 1/27/2025 had bother with urination, urinary frequency, urgency and dysuria at tip of the penis.  Not related to sexual activity.  Saw PCP treated with Bactrim. Urination was back to baseline.  On Flomax, had variable stream with tapering at end, daytime frequency 5 to 10 x and nocturia 0 to 1 x.  Denied hesitancy, straining, intermittency, urgency, incontinence or sense of incomplete emptying.  Denied dysuria, hematuria, lower abdominal or flank pain, nausea, vomiting, fever, chills or rigors.  Had erectile dysfunction, rated erections: 3/5. With Tadalafil 20 mg got good response.  Not sexually active. Had low libido.  Took medicines for Anxiety and Depression.   PSA: 6.26 (2/9/2022).  MRI Prostate (3/1/2022): Prostate volume: 71 cc.  No MRI targetable lesions. Patchy inflammatory type enhancement in the peripheral zone. PIRADS 2.   Saw me in 2019 and subsequently saw Dr Bucio.  Discharged